# Patient Record
Sex: FEMALE | Race: WHITE | Employment: OTHER | URBAN - METROPOLITAN AREA
[De-identification: names, ages, dates, MRNs, and addresses within clinical notes are randomized per-mention and may not be internally consistent; named-entity substitution may affect disease eponyms.]

---

## 2020-03-21 ENCOUNTER — HOSPITAL ENCOUNTER (INPATIENT)
Age: 70
LOS: 5 days | Discharge: HOME HEALTH | DRG: 483 | End: 2020-03-26
Attending: EMERGENCY MEDICINE | Admitting: FAMILY MEDICINE
Payer: MEDICARE

## 2020-03-21 ENCOUNTER — APPOINTMENT (OUTPATIENT)
Dept: GENERAL RADIOLOGY | Age: 70
DRG: 483 | End: 2020-03-21
Payer: MEDICARE

## 2020-03-21 ENCOUNTER — APPOINTMENT (OUTPATIENT)
Dept: CT IMAGING | Age: 70
DRG: 483 | End: 2020-03-21
Payer: MEDICARE

## 2020-03-21 PROBLEM — I10 ESSENTIAL HYPERTENSION: Status: ACTIVE | Noted: 2020-03-21

## 2020-03-21 PROBLEM — E78.2 MIXED HYPERLIPIDEMIA: Status: ACTIVE | Noted: 2020-03-21

## 2020-03-21 PROBLEM — S42.91XA SHOULDER FRACTURE, RIGHT, CLOSED, INITIAL ENCOUNTER: Status: ACTIVE | Noted: 2020-03-21

## 2020-03-21 PROBLEM — S43.004A CLOSED DISLOCATION OF RIGHT SHOULDER: Status: ACTIVE | Noted: 2020-03-21

## 2020-03-21 LAB
-: NORMAL
ABSOLUTE EOS #: 0.5 K/UL (ref 0–0.44)
ABSOLUTE IMMATURE GRANULOCYTE: 0.16 K/UL (ref 0–0.3)
ABSOLUTE LYMPH #: 1.92 K/UL (ref 1.1–3.7)
ABSOLUTE MONO #: 0.99 K/UL (ref 0.1–1.2)
AMORPHOUS: NORMAL
ANION GAP SERPL CALCULATED.3IONS-SCNC: 16 MMOL/L (ref 9–17)
BACTERIA: NORMAL
BASOPHILS # BLD: 1 % (ref 0–2)
BASOPHILS ABSOLUTE: 0.07 K/UL (ref 0–0.2)
BILIRUBIN URINE: NEGATIVE
BUN BLDV-MCNC: 16 MG/DL (ref 8–23)
BUN/CREAT BLD: ABNORMAL (ref 9–20)
CALCIUM SERPL-MCNC: 9.4 MG/DL (ref 8.6–10.4)
CASTS UA: NORMAL /LPF (ref 0–8)
CHLORIDE BLD-SCNC: 101 MMOL/L (ref 98–107)
CHOLESTEROL/HDL RATIO: 3.4
CHOLESTEROL: 125 MG/DL
CO2: 16 MMOL/L (ref 20–31)
COLOR: YELLOW
COMMENT UA: ABNORMAL
CREAT SERPL-MCNC: 0.94 MG/DL (ref 0.5–0.9)
CRYSTALS, UA: NORMAL /HPF
DIFFERENTIAL TYPE: ABNORMAL
EOSINOPHILS RELATIVE PERCENT: 3 % (ref 1–4)
EPITHELIAL CELLS UA: NORMAL /HPF (ref 0–5)
GFR AFRICAN AMERICAN: >60 ML/MIN
GFR NON-AFRICAN AMERICAN: 59 ML/MIN
GFR SERPL CREATININE-BSD FRML MDRD: ABNORMAL ML/MIN/{1.73_M2}
GFR SERPL CREATININE-BSD FRML MDRD: ABNORMAL ML/MIN/{1.73_M2}
GLUCOSE BLD-MCNC: 238 MG/DL (ref 65–105)
GLUCOSE BLD-MCNC: 240 MG/DL (ref 70–99)
GLUCOSE URINE: ABNORMAL
HCT VFR BLD CALC: 38.3 % (ref 36.3–47.1)
HDLC SERPL-MCNC: 37 MG/DL
HEMOGLOBIN: 12.6 G/DL (ref 11.9–15.1)
IMMATURE GRANULOCYTES: 1 %
INR BLD: 1
KETONES, URINE: ABNORMAL
LDL CHOLESTEROL: 61 MG/DL (ref 0–130)
LEUKOCYTE ESTERASE, URINE: ABNORMAL
LYMPHOCYTES # BLD: 13 % (ref 24–43)
MCH RBC QN AUTO: 30.4 PG (ref 25.2–33.5)
MCHC RBC AUTO-ENTMCNC: 32.9 G/DL (ref 28.4–34.8)
MCV RBC AUTO: 92.5 FL (ref 82.6–102.9)
MONOCYTES # BLD: 7 % (ref 3–12)
MUCUS: NORMAL
NITRITE, URINE: NEGATIVE
NRBC AUTOMATED: 0 PER 100 WBC
OTHER OBSERVATIONS UA: NORMAL
PARTIAL THROMBOPLASTIN TIME: 18.6 SEC (ref 20.5–30.5)
PDW BLD-RTO: 12.8 % (ref 11.8–14.4)
PH UA: 5 (ref 5–8)
PLATELET # BLD: 366 K/UL (ref 138–453)
PLATELET ESTIMATE: ABNORMAL
PMV BLD AUTO: 10.9 FL (ref 8.1–13.5)
POTASSIUM SERPL-SCNC: 4.7 MMOL/L (ref 3.7–5.3)
PROTEIN UA: ABNORMAL
PROTHROMBIN TIME: 10.4 SEC (ref 9–12)
RBC # BLD: 4.14 M/UL (ref 3.95–5.11)
RBC # BLD: ABNORMAL 10*6/UL
RBC UA: NORMAL /HPF (ref 0–4)
RENAL EPITHELIAL, UA: NORMAL /HPF
SEG NEUTROPHILS: 75 % (ref 36–65)
SEGMENTED NEUTROPHILS ABSOLUTE COUNT: 11.13 K/UL (ref 1.5–8.1)
SODIUM BLD-SCNC: 133 MMOL/L (ref 135–144)
SPECIFIC GRAVITY UA: 1.02 (ref 1–1.03)
TRICHOMONAS: NORMAL
TRIGL SERPL-MCNC: 133 MG/DL
TURBIDITY: ABNORMAL
URINE HGB: NEGATIVE
UROBILINOGEN, URINE: NORMAL
VITAMIN D 25-HYDROXY: 8.4 NG/ML (ref 30–100)
VLDLC SERPL CALC-MCNC: ABNORMAL MG/DL (ref 1–30)
WBC # BLD: 14.8 K/UL (ref 3.5–11.3)
WBC # BLD: ABNORMAL 10*3/UL
WBC UA: NORMAL /HPF (ref 0–5)
YEAST: NORMAL

## 2020-03-21 PROCEDURE — 96374 THER/PROPH/DIAG INJ IV PUSH: CPT

## 2020-03-21 PROCEDURE — 76376 3D RENDER W/INTRP POSTPROCES: CPT

## 2020-03-21 PROCEDURE — 85730 THROMBOPLASTIN TIME PARTIAL: CPT

## 2020-03-21 PROCEDURE — 71045 X-RAY EXAM CHEST 1 VIEW: CPT

## 2020-03-21 PROCEDURE — 6370000000 HC RX 637 (ALT 250 FOR IP): Performed by: NURSE PRACTITIONER

## 2020-03-21 PROCEDURE — 80061 LIPID PANEL: CPT

## 2020-03-21 PROCEDURE — 73030 X-RAY EXAM OF SHOULDER: CPT

## 2020-03-21 PROCEDURE — 73020 X-RAY EXAM OF SHOULDER: CPT

## 2020-03-21 PROCEDURE — 73060 X-RAY EXAM OF HUMERUS: CPT

## 2020-03-21 PROCEDURE — 93005 ELECTROCARDIOGRAM TRACING: CPT | Performed by: NURSE PRACTITIONER

## 2020-03-21 PROCEDURE — 1200000000 HC SEMI PRIVATE

## 2020-03-21 PROCEDURE — 99285 EMERGENCY DEPT VISIT HI MDM: CPT

## 2020-03-21 PROCEDURE — 83036 HEMOGLOBIN GLYCOSYLATED A1C: CPT

## 2020-03-21 PROCEDURE — 85025 COMPLETE CBC W/AUTO DIFF WBC: CPT

## 2020-03-21 PROCEDURE — 85610 PROTHROMBIN TIME: CPT

## 2020-03-21 PROCEDURE — 73200 CT UPPER EXTREMITY W/O DYE: CPT

## 2020-03-21 PROCEDURE — 6360000002 HC RX W HCPCS: Performed by: EMERGENCY MEDICINE

## 2020-03-21 PROCEDURE — 0RSJXZZ REPOSITION RIGHT SHOULDER JOINT, EXTERNAL APPROACH: ICD-10-PCS | Performed by: EMERGENCY MEDICINE

## 2020-03-21 PROCEDURE — 82947 ASSAY GLUCOSE BLOOD QUANT: CPT

## 2020-03-21 PROCEDURE — 2500000003 HC RX 250 WO HCPCS: Performed by: EMERGENCY MEDICINE

## 2020-03-21 PROCEDURE — 23650 CLTX SHO DSLC W/MNPJ WO ANES: CPT

## 2020-03-21 PROCEDURE — 82306 VITAMIN D 25 HYDROXY: CPT

## 2020-03-21 PROCEDURE — 6360000002 HC RX W HCPCS: Performed by: STUDENT IN AN ORGANIZED HEALTH CARE EDUCATION/TRAINING PROGRAM

## 2020-03-21 PROCEDURE — 81001 URINALYSIS AUTO W/SCOPE: CPT

## 2020-03-21 PROCEDURE — 80048 BASIC METABOLIC PNL TOTAL CA: CPT

## 2020-03-21 RX ORDER — ETOMIDATE 2 MG/ML
30 INJECTION INTRAVENOUS ONCE
Status: DISCONTINUED | OUTPATIENT
Start: 2020-03-21 | End: 2020-03-23

## 2020-03-21 RX ORDER — ATORVASTATIN CALCIUM 20 MG/1
20 TABLET, FILM COATED ORAL DAILY
Status: DISCONTINUED | OUTPATIENT
Start: 2020-03-21 | End: 2020-03-26 | Stop reason: HOSPADM

## 2020-03-21 RX ORDER — OXYCODONE HYDROCHLORIDE AND ACETAMINOPHEN 5; 325 MG/1; MG/1
2 TABLET ORAL EVERY 4 HOURS PRN
Status: DISCONTINUED | OUTPATIENT
Start: 2020-03-21 | End: 2020-03-23

## 2020-03-21 RX ORDER — FENOFIBRATE 160 MG/1
160 TABLET ORAL DAILY
Status: DISCONTINUED | OUTPATIENT
Start: 2020-03-21 | End: 2020-03-26 | Stop reason: HOSPADM

## 2020-03-21 RX ORDER — GLUCAGON 1 MG/ML
1 KIT INJECTION PRN
Status: DISCONTINUED | OUTPATIENT
Start: 2020-03-21 | End: 2020-03-26 | Stop reason: HOSPADM

## 2020-03-21 RX ORDER — CARVEDILOL 6.25 MG/1
6.25 TABLET ORAL 2 TIMES DAILY WITH MEALS
Status: DISCONTINUED | OUTPATIENT
Start: 2020-03-21 | End: 2020-03-22

## 2020-03-21 RX ORDER — FENTANYL CITRATE 50 UG/ML
100 INJECTION, SOLUTION INTRAMUSCULAR; INTRAVENOUS ONCE
Status: DISCONTINUED | OUTPATIENT
Start: 2020-03-21 | End: 2020-03-23

## 2020-03-21 RX ORDER — ETOMIDATE 2 MG/ML
INJECTION INTRAVENOUS DAILY PRN
Status: COMPLETED | OUTPATIENT
Start: 2020-03-21 | End: 2020-03-21

## 2020-03-21 RX ORDER — INSULIN GLARGINE 100 [IU]/ML
30 INJECTION, SOLUTION SUBCUTANEOUS 2 TIMES DAILY
COMMUNITY

## 2020-03-21 RX ORDER — DEXTROSE MONOHYDRATE 50 MG/ML
100 INJECTION, SOLUTION INTRAVENOUS PRN
Status: DISCONTINUED | OUTPATIENT
Start: 2020-03-21 | End: 2020-03-26 | Stop reason: HOSPADM

## 2020-03-21 RX ORDER — ATORVASTATIN CALCIUM 40 MG/1
40 TABLET, FILM COATED ORAL DAILY
COMMUNITY

## 2020-03-21 RX ORDER — ASPIRIN 81 MG/1
81 TABLET, CHEWABLE ORAL DAILY
Status: DISCONTINUED | OUTPATIENT
Start: 2020-03-21 | End: 2020-03-26 | Stop reason: HOSPADM

## 2020-03-21 RX ORDER — NICOTINE POLACRILEX 4 MG
15 LOZENGE BUCCAL PRN
Status: DISCONTINUED | OUTPATIENT
Start: 2020-03-21 | End: 2020-03-26 | Stop reason: HOSPADM

## 2020-03-21 RX ORDER — OXYCODONE HYDROCHLORIDE AND ACETAMINOPHEN 5; 325 MG/1; MG/1
1 TABLET ORAL EVERY 4 HOURS PRN
Status: DISCONTINUED | OUTPATIENT
Start: 2020-03-21 | End: 2020-03-23

## 2020-03-21 RX ORDER — DEXTROSE MONOHYDRATE 25 G/50ML
12.5 INJECTION, SOLUTION INTRAVENOUS PRN
Status: DISCONTINUED | OUTPATIENT
Start: 2020-03-21 | End: 2020-03-26 | Stop reason: HOSPADM

## 2020-03-21 RX ORDER — FENTANYL CITRATE 50 UG/ML
50 INJECTION, SOLUTION INTRAMUSCULAR; INTRAVENOUS ONCE
Status: COMPLETED | OUTPATIENT
Start: 2020-03-21 | End: 2020-03-21

## 2020-03-21 RX ADMIN — FENTANYL CITRATE 50 MCG: 50 INJECTION, SOLUTION INTRAMUSCULAR; INTRAVENOUS at 13:20

## 2020-03-21 RX ADMIN — HYDROMORPHONE HYDROCHLORIDE 0.5 MG: 1 INJECTION, SOLUTION INTRAMUSCULAR; INTRAVENOUS; SUBCUTANEOUS at 18:32

## 2020-03-21 RX ADMIN — FENOFIBRATE 160 MG: 160 TABLET ORAL at 21:32

## 2020-03-21 RX ADMIN — INSULIN LISPRO 1 UNITS: 100 INJECTION, SOLUTION INTRAVENOUS; SUBCUTANEOUS at 21:33

## 2020-03-21 RX ADMIN — ATORVASTATIN CALCIUM 20 MG: 20 TABLET, FILM COATED ORAL at 21:32

## 2020-03-21 RX ADMIN — ASPIRIN 81 MG 81 MG: 81 TABLET ORAL at 21:32

## 2020-03-21 RX ADMIN — HYDROMORPHONE HYDROCHLORIDE 0.5 MG: 1 INJECTION, SOLUTION INTRAMUSCULAR; INTRAVENOUS; SUBCUTANEOUS at 16:28

## 2020-03-21 RX ADMIN — CARVEDILOL 6.25 MG: 6.25 TABLET, FILM COATED ORAL at 21:32

## 2020-03-21 RX ADMIN — OXYCODONE HYDROCHLORIDE AND ACETAMINOPHEN 2 TABLET: 5; 325 TABLET ORAL at 23:10

## 2020-03-21 RX ADMIN — ETOMIDATE 20 MG: 2 INJECTION, SOLUTION INTRAVENOUS at 14:42

## 2020-03-21 RX ADMIN — Medication 100 MCG: at 14:41

## 2020-03-21 RX ADMIN — METFORMIN HYDROCHLORIDE 500 MG: 500 TABLET ORAL at 22:33

## 2020-03-21 ASSESSMENT — PAIN DESCRIPTION - PROGRESSION: CLINICAL_PROGRESSION: NOT CHANGED

## 2020-03-21 ASSESSMENT — PAIN DESCRIPTION - ORIENTATION: ORIENTATION: RIGHT

## 2020-03-21 ASSESSMENT — PAIN DESCRIPTION - PAIN TYPE: TYPE: ACUTE PAIN

## 2020-03-21 ASSESSMENT — PAIN DESCRIPTION - DESCRIPTORS: DESCRIPTORS: ACHING

## 2020-03-21 ASSESSMENT — PAIN SCALES - GENERAL
PAINLEVEL_OUTOF10: 8
PAINLEVEL_OUTOF10: 6
PAINLEVEL_OUTOF10: 5
PAINLEVEL_OUTOF10: 5
PAINLEVEL_OUTOF10: 7
PAINLEVEL_OUTOF10: 4
PAINLEVEL_OUTOF10: 8

## 2020-03-21 ASSESSMENT — PAIN DESCRIPTION - LOCATION: LOCATION: SHOULDER;RIB CAGE

## 2020-03-21 ASSESSMENT — PAIN DESCRIPTION - FREQUENCY: FREQUENCY: CONTINUOUS

## 2020-03-21 ASSESSMENT — PAIN DESCRIPTION - ONSET: ONSET: SUDDEN

## 2020-03-21 NOTE — ED PROVIDER NOTES
[address]  Emergency Department Encounter  Emergency Medicine Resident     Pt Name: Eryn Ching  MRN: [id number]  [place] 1950  Date of evaluation: 3/21/20  PCP:  Liliana Hernandez MD    [address] [place] [address]       Chief Complaint   Patient presents with    Shoulder Pain       HISTORY OF PRESENT ILLNESS  (Location/Symptom, Timing/Onset, Context/Setting, Quality, Duration, Modifying Factors, Severity.)    Eryn Ching is a 81 y.o. female who presents with right shoulder pain. Patient states that she was walking in her kitchen and tripped over a event that was placed in the ground. She fell forward and hit the wall into her right side. She has significant 8 out of 10 severity shoulder pain. Is localized to the right shoulder. She is unable to move it afterwards. No numbness weakness or tingling. Denies any loss of consciousness head strike neck pain or back pain. PAST MEDICAL / SURGICAL / SOCIAL / FAMILY HISTORY    has a past medical history of Bundle branch block, right, Degenerative arthritis of lumbar spine, Diabetes mellitus ([address] Diverticulosis of intestine, Hyperlipidemia, Hypertension, Scoliosis of lumbosacral spine, and Snores. has a past surgical history that includes Hysterectomy;  section, classic; Knee Arthroplasty (Right, ); Cholecystectomy (); back surgery (13); Cardiac catheterization (); and Total shoulder arthroplasty (Right, 2020).     Social History     Socioeconomic History    Marital status:      Spouse name: Not on file    Number of children: Not on file    Years of education: Not on file    Highest education level: Not on file   Occupational History    Not on file   Social Needs    Financial resource strain: Not on file    Food insecurity     Worry: Not on file     Inability: Not on file    Transportation needs     Medical: Not on file     Non-medical: Not on file   Tobacco Use    Smoking status: Never Smoker 10/1/13  Yes Isaac Bose MD   glipiZIDE (GLUCOTROL) 10 MG tablet Take 5 mg by mouth 2 times daily (before meals)    Yes Historical Provider, MD   metFORMIN (GLUCOPHAGE) 500 MG tablet Take 500 mg by mouth 2 times daily (with meals). Yes Historical Provider, MD   simvastatin (ZOCOR) 40 MG tablet Take 40 mg by mouth nightly. Yes Historical Provider, MD   fenofibrate (TRICOR) 145 MG tablet Take 145 mg by mouth nightly. Yes Historical Provider, MD   naproxen (NAPROSYN) 500 MG tablet Take 1 tablet by mouth 2 times daily as needed. 10/1/13   Isaac Bose MD   carvedilol (COREG) 6.25 MG tablet Take 6.25 mg by mouth 2 times daily (with meals). Historical Provider, MD   cyclobenzaprine (FLEXERIL) 10 MG tablet Take 10 mg by mouth daily as needed. Historical Provider, MD       REVIEW OF SYSTEMS    (2-9 systems for level 4, 10 or more for level 5)      Constitutional : - fever , - chills, - weight change  HENT: - hearing loss , - rhinorrhea , - tinnitus , - trouble swallowing , - voice change  Eyes: - photophobia , - visual disturbance  Resp: - cough , - shortness of breath   Cardio: - chest pain , - leg swelling , - palpitations  GI: - nausea, - vomiting, - abd pain , - black/bloody BMs, - constipation , - diarrhea   Endocrine: - heat intolerance , - cold intolerance  : - dysuria, - frequency, - hematuria   MSK: - back pain , - neck pain   ALG: - food allergies  Neuro: - dizziness ,  - headache, - weakness, - syncope  Skin: - wound , - rash   Heme: - bruise easily  Psych: - agitation , - confusion      PHYSICAL EXAM   (up to 7 for level 4, 8 or more for level 5)     INITIAL VITALS:  height is 5' 2\" (1.575 m) and weight is 210 lb (95.3 kg). Her axillary temperature is 98 °F (36.7 °C). Her blood pressure is 130/68 and her pulse is 70. Her respiration is 17 and oxygen saturation is 95%.      Physical Exam   GENERAL: Not in acute distress, well developed, not diaphoretic  HENT: normocephalic, nose nml  EYES:  Notify physician if multiplier less than 0.01 results in insulin rate of 0 units/hr    Daily weights    Intake and output    Nurse to change IV Fluid when blood glucose (BG) reaches 250 mg/dL    Inpatient consult to Orthopedic Surgery    Inpatient consult to Hospitalist    OT eval and treat    PT eval and treat    Incentive spirometry    POCT Glucose    POCT Glucose    POC Glucose Fingerstick    POC Glucose Fingerstick    POC Glucose Fingerstick    POC Glucose Fingerstick    POC Glucose Fingerstick    POC Glucose Fingerstick    POC Glucose Fingerstick    POC Glucose Fingerstick    POC Glucose Fingerstick    POC Glucose Fingerstick    POCT Glucose - every hour    POC Glucose Fingerstick    POC Glucose Fingerstick    POC Glucose Fingerstick    POC Glucose Fingerstick    POC Glucose Fingerstick    POC Glucose Fingerstick    POC Glucose Fingerstick    POC Glucose Fingerstick    POC Glucose Fingerstick    POC Glucose Fingerstick    EKG 12 Lead    EKG 12 Lead    EKG REPORT    EKG REPORT    ECHO Complete 2D W Doppler W Color    PATIENT STATUS (FROM ED OR OR/PROCEDURAL) Inpatient    Transfer Patient    Transfer patient    [employer] Arm Sling, Right; M       MEDICATIONS ORDERED:  Orders Placed This Encounter   Medications    fentaNYL (SUBLIMAZE) injection 50 mcg    DISCONTD: etomidate (AMIDATE) injection 30 mg    DISCONTD: fentaNYL (SUBLIMAZE) injection 100 mcg    fentaNYL (SUBLIMAZE) IV    etomidate (AMIDATE) injection    HYDROmorphone (DILAUDID) injection 0.5 mg    aspirin chewable tablet 81 mg    DISCONTD: carvedilol (COREG) tablet 6.25 mg    fenofibrate (TRIGLIDE) tablet 160 mg    DISCONTD: metFORMIN (GLUCOPHAGE) tablet 500 mg    atorvastatin (LIPITOR) tablet 20 mg    DISCONTD: insulin lispro (HUMALOG) injection vial 0-6 Units    DISCONTD: insulin lispro (HUMALOG) injection vial 0-3 Units    glucose (GLUTOSE) 40 % oral gel 15 g    dextrose 50 % IV solution    glucagon injection 1 mg    dextrose 5 % solution    HYDROmorphone (DILAUDID) injection 0.5 mg    DISCONTD: oxyCODONE-acetaminophen (PERCOCET) 5-325 MG per tablet 1 tablet    DISCONTD: oxyCODONE-acetaminophen (PERCOCET) 5-325 MG per tablet 2 tablet    amLODIPine (NORVASC) tablet 5 mg    ceFAZolin (ANCEF) 2 g in dextrose 5 % 50 mL IVPB    sodium polystyrene (KAYEXALATE) 15 GM/60ML suspension 15 g    vitamin D (ERGOCALCIFEROL) 1.25 MG (39001 UT) CAPS capsule     Sig: Take 1 capsule by mouth once a week for 8 doses     Dispense:  8 capsule     Refill:  0    DISCONTD: lactated ringers infusion    midazolam (VERSED) 2 MG/2ML injection     DELMY GRACIA: cabinet override    DISCONTD: fentaNYL (SUBLIMAZE) injection 100 mcg    DISCONTD: midazolam (VERSED) injection 2 mg    bupivacaine (PF) (MARCAINE) 0.5 % injection 150 mg    DISCONTD: HYDROmorphone (DILAUDID) injection 0.25 mg    DISCONTD: HYDROmorphone (DILAUDID) injection 0.5 mg    DISCONTD: promethazine (PHENERGAN) injection 6.25 mg    midazolam (VERSED) injection 1 mg    fentaNYL (SUBLIMAZE) injection 50 mcg    sodium chloride 0.9 % irrigation    ceFAZolin (ANCEF) 2 g in dextrose 5 % 50 mL IVPB    acetaminophen (TYLENOL) tablet 1,000 mg     Maximum dose of acetaminophen is 4000 mg from all sources in 24 hours. Reduce dose to 650 mg in patients , 65 kg. Avoid in patient with liver disease    ketorolac (TORADOL) injection 30 mg    oxyCODONE (ROXICODONE) immediate release tablet 10 mg    oxyCODONE (ROXICODONE) immediate release tablet 5 mg    HYDROcodone-acetaminophen (NORCO) 5-325 MG per tablet     Sig: Take 1 tablet by mouth every 6 hours as needed for Pain for up to 7 days. Intended supply: 5 days.  Take lowest dose possible to manage pain     Dispense:  28 tablet     Refill:  0     Reduce doses taken as pain becomes manageable    DISCONTD: labetalol (NORMODYNE;TRANDATE) injection 10 mg    insulin regular (HUMULIN perfusion. [RB]   1187 Discussed with orthopedic surgery, has requested 1 view axillary will obtain he will evaluate patient. [RB]   [id number] Per orthopedic surgery they are reviewing imaging with attending and alignment no but patient will need from surgery.     [RB]      ED Course User Index  [RB] Smiley Curry DO         DIAGNOSTIC RESULTS / EMERGENCYDEPARTMENT COURSE   LABS:  Labs Reviewed   CBC WITH AUTO DIFFERENTIAL - Abnormal; Notable for the following components:       Result Value    WBC 14.8 (*)     Seg Neutrophils 75 (*)     Lymphocytes 13 (*)     Immature Granulocytes 1 (*)     Segs Absolute 11.13 (*)     Absolute Eos # 0.50 (*)     All other components within normal limits   BASIC METABOLIC PANEL - Abnormal; Notable for the following components:    Glucose 240 (*)     CREATININE 0.94 (*)     Sodium 133 (*)     CO2 16 (*)     GFR Non- 59 (*)     All other components within normal limits   APTT - Abnormal; Notable for the following components:    PTT 18.6 (*)     All other components within normal limits   URINALYSIS - Abnormal; Notable for the following components:    Turbidity UA CLOUDY (*)     Glucose, Ur 3+ (*)     Ketones, Urine TRACE (*)     Protein, UA 1+ (*)     Leukocyte Esterase, Urine SMALL (*)     All other components within normal limits   HEMOGLOBIN A1C - Abnormal; Notable for the following components:    Hemoglobin A1C 8.8 (*)     All other components within normal limits   LIPID PANEL - Abnormal; Notable for the following components:    HDL 37 (*)     All other components within normal limits   VITAMIN D 25 HYDROXY - Abnormal; Notable for the following components:    Vit D, 25-Hydroxy 8.4 (*)     All other components within normal limits   BRAIN NATRIURETIC PEPTIDE - Abnormal; Notable for the following components:    Pro-BNP 1,242 (*)     All other components within normal limits   TROPONIN - Abnormal; Notable for the following components:    Troponin, High Sensitivity 16 (*)     All other components within normal limits   COMP METABOLIC W/ BILI PROFILE - Abnormal; Notable for the following components:    BUN 27 (*)     CREATININE 1.29 (*)     Glucose 194 (*)     Sodium 134 (*)     Potassium 5.4 (*)     CO2 16 (*)     GFR Non- 41 (*)     GFR  50 (*)     All other components within normal limits   CBC WITH AUTO DIFFERENTIAL - Abnormal; Notable for the following components:    RBC 3.72 (*)     Hemoglobin 11.5 (*)     NRBC Automated 0.2 (*)     Seg Neutrophils 71 (*)     Lymphocytes 14 (*)     Immature Granulocytes 1 (*)     Absolute Mono # 1.27 (*)     All other components within normal limits   TROP/MYOGLOBIN - Abnormal; Notable for the following components:    Troponin, High Sensitivity 16 (*)     Myoglobin 173 (*)     All other components within normal limits   TROP/MYOGLOBIN - Abnormal; Notable for the following components:    Troponin, High Sensitivity 15 (*)     Myoglobin 143 (*)     All other components within normal limits   TROP/MYOGLOBIN - Abnormal; Notable for the following components:    Myoglobin 94 (*)     All other components within normal limits   BASIC METABOLIC PANEL - Abnormal; Notable for the following components:    Glucose 332 (*)     BUN 33 (*)     CREATININE 1.61 (*)     Calcium 8.2 (*)     Sodium 130 (*)     Potassium 5.4 (*)     Chloride 97 (*)     CO2 13 (*)     Anion Gap 20 (*)     GFR Non- 32 (*)     GFR  39 (*)     All other components within normal limits   BETA-HYDROXYBUTYRATE - Abnormal; Notable for the following components:    Beta-Hydroxybutyrate 2.65 (*)     All other components within normal limits   BASIC METABOLIC PANEL - Abnormal; Notable for the following components:    Glucose 239 (*)     BUN 35 (*)     CREATININE 1.70 (*)     Calcium 8.4 (*)     Sodium 132 (*)     CO2 19 (*)     GFR Non- 30 (*)     GFR  36 (*)     All other components within normal limits   BASIC METABOLIC PANEL - Abnormal; Notable for the following components:    Glucose 157 (*)     BUN 34 (*)     CREATININE 1.47 (*)     Calcium 8.0 (*)     Sodium 131 (*)     Potassium 5.4 (*)     CO2 16 (*)     GFR Non- 35 (*)     GFR  43 (*)     All other components within normal limits   BASIC METABOLIC PANEL - Abnormal; Notable for the following components:    Glucose 131 (*)     BUN 35 (*)     CREATININE 1.52 (*)     Calcium 8.0 (*)     Sodium 134 (*)     CO2 18 (*)     GFR Non- 34 (*)     GFR  41 (*)     All other components within normal limits   POC GLUCOSE FINGERSTICK - Abnormal; Notable for the following components:    POC Glucose 238 (*)     All other components within normal limits   POC GLUCOSE FINGERSTICK - Abnormal; Notable for the following components:    POC Glucose 174 (*)     All other components within normal limits   POC GLUCOSE FINGERSTICK - Abnormal; Notable for the following components:    POC Glucose 178 (*)     All other components within normal limits   POC GLUCOSE FINGERSTICK - Abnormal; Notable for the following components:    POC Glucose 194 (*)     All other components within normal limits   POC GLUCOSE FINGERSTICK - Abnormal; Notable for the following components:    POC Glucose 184 (*)     All other components within normal limits   POC GLUCOSE FINGERSTICK - Abnormal; Notable for the following components:    POC Glucose 231 (*)     All other components within normal limits   POC GLUCOSE FINGERSTICK - Abnormal; Notable for the following components:    POC Glucose 141 (*)     All other components within normal limits   POC GLUCOSE FINGERSTICK - Abnormal; Notable for the following components:    POC Glucose 268 (*)     All other components within normal limits   POC GLUCOSE FINGERSTICK - Abnormal; Notable for the following components:    POC Glucose 293 (*)     All other components within normal limits   POC GLUCOSE FINGERSTICK - Abnormal; Notable for the following components:    POC Glucose 297 (*)     All other components within normal limits   POC GLUCOSE FINGERSTICK - Abnormal; Notable for the following components:    POC Glucose 290 (*)     All other components within normal limits   POC GLUCOSE FINGERSTICK - Abnormal; Notable for the following components:    POC Glucose 228 (*)     All other components within normal limits   POC GLUCOSE FINGERSTICK - Abnormal; Notable for the following components:    POC Glucose 175 (*)     All other components within normal limits   POC GLUCOSE FINGERSTICK - Abnormal; Notable for the following components:    POC Glucose 179 (*)     All other components within normal limits   POC GLUCOSE FINGERSTICK - Abnormal; Notable for the following components:    POC Glucose 176 (*)     All other components within normal limits   POC GLUCOSE FINGERSTICK - Abnormal; Notable for the following components:    POC Glucose 157 (*)     All other components within normal limits   POC GLUCOSE FINGERSTICK - Abnormal; Notable for the following components:    POC Glucose 158 (*)     All other components within normal limits   POC GLUCOSE FINGERSTICK - Abnormal; Notable for the following components:    POC Glucose 146 (*)     All other components within normal limits   POC GLUCOSE FINGERSTICK - Abnormal; Notable for the following components:    POC Glucose 130 (*)     All other components within normal limits   POC GLUCOSE FINGERSTICK - Abnormal; Notable for the following components:    POC Glucose 136 (*)     All other components within normal limits   PROTIME-INR   MICROSCOPIC URINALYSIS   IMMATURE PLATELET FRACTION   POTASSIUM   LACTIC ACID, WHOLE BLOOD   MAGNESIUM   MAGNESIUM   MAGNESIUM   PHOSPHORUS   PHOSPHORUS   PHOSPHORUS   BASIC METABOLIC PANEL   MAGNESIUM   PHOSPHORUS   BASIC METABOLIC PANEL   MAGNESIUM   PHOSPHORUS   BASIC METABOLIC PANEL   MAGNESIUM   PHOSPHORUS   POCT GLUCOSE   POCT GLUCOSE DISPOSITION            PATIENT REFERRED TO:  Jay Jay Marie MD  [address] [address]  [address], [place] [phone]    Schedule an appointment as soon as possible for a visit in 2 weeks  For suture removal, For wound re-check      DISCHARGE MEDICATIONS:  Current Discharge Medication List      START taking these medications    Details   vitamin D (ERGOCALCIFEROL) 1.25 MG (99729 UT) CAPS capsule Take 1 capsule by mouth once a week for 8 doses  Qty: 8 capsule, Refills: 0      HYDROcodone-acetaminophen (NORCO) 5-325 MG per tablet Take 1 tablet by mouth every 6 hours as needed for Pain for up to 7 days. Intended supply: 5 days. Take lowest dose possible to manage pain  Qty: 28 tablet, Refills: 0    Comments: Reduce doses taken as pain becomes manageable  Associated Diagnoses: Closed fracture dislocation of right shoulder, initial encounter             Dr. Maria Fernanda Chan.  [address] [place] [address]  Emergency Medicine Resident Physician, PGY-2    (Please note that portions of this note were completed with a voice recognition program.  Efforts were made to edit the dictations but occasionally words are mis-transcribed.)         Mendy Vaca DO  Resident  03/24/20 9752

## 2020-03-21 NOTE — ED NOTES
Patient presents to ED via [facility] for evaluation of right shoulder pain. Patient reports she was walking in her kitchen PTA when she tripped on a heating vent on the floor and fell forward. Patient attempted to catch herself on the wall using her right arm, resulting in extreme right shoulder pain. Patient reports right sided rib pain as well. Patient denies hitting her head. No LOC. Patient was given 50 mcg fentanyl en route per EMS. Patient reports some nausea, but denying need for antiemetic at this time. Patient right arm placed in sling PTA by EMS.      Jhonny Pearson RN  03/21/20 4205

## 2020-03-21 NOTE — CONSULTS
Orthopedic Surgery Consult  (Dr. Talia Dahl)      CC/Reason for consult:  Right proximal humerus fracture-dislocation    HPI:      The patient is a 81 y.o. female with presents to [facility] after mechanical fall earlier today. Pt reports she landed onto her right shoulder. Pt felt immediate pain and pain with ROM. Pt is right handed. Pt denies hitting head, LOC. Pt denies numbness, tingling. Pt was closed reduced by ED team under conscious sedation. Pt reports pain improved after reduction. Pt denies prior history of right shoulder pain. Pt denies prior dislocations to right shoulder. Pt denies pain elsewhere. Pt with medical hx of DM, HTN. Past Medical History:    Past Medical History:   Diagnosis Date    Bundle branch block, right     Degenerative arthritis of lumbar spine     Diabetes mellitus [address] [address]     Diverticulosis of intestine     Scoliosis of lumbosacral spine        Past Surgical History:    Past Surgical History:   Procedure Laterality Date    BACK SURGERY  13    l4-5/l5-s1 laminotomies and formanotomies    CARDIAC CATHETERIZATION      coronary artery block-- minimal     SECTION, CLASSIC      2 times    CHOLECYSTECTOMY  1984    HYSTERECTOMY      KNEE ARTHROPLASTY Right        Medications Prior to Admission:   Prior to Admission medications    Medication Sig Start Date End Date Taking? Authorizing Provider   insulin glargine (LANTUS) 100 UNIT/ML injection vial Inject 30 Units into the skin 2 times daily   Yes Historical Provider, MD   aspirin 81 MG tablet Take 1 tablet by mouth daily. 10/1/13  Yes Christie Corbin MD   glipiZIDE (GLUCOTROL) 10 MG tablet Take 5 mg by mouth 2 times daily (before meals)    Yes Historical Provider, MD   metFORMIN (GLUCOPHAGE) 500 MG tablet Take 500 mg by mouth 2 times daily (with meals). Yes Historical Provider, MD   simvastatin (ZOCOR) 40 MG tablet Take 40 mg by mouth nightly.    Yes Historical Provider, MD   fenofibrate (TRICOR) 145 MG tablet Take 145 mg by mouth nightly. Yes Historical Provider, MD   oxyCODONE-acetaminophen (PERCOCET) 5-325 MG per tablet 1-2 po q 4-6 hrs prn pain 9/24/13   Scotty Nick MD   naproxen (NAPROSYN) 500 MG tablet Take 1 tablet by mouth 2 times daily as needed. 10/1/13   Johnny Miner MD   carvedilol (COREG) 6.25 MG tablet Take 6.25 mg by mouth 2 times daily (with meals). Historical Provider, MD   cyclobenzaprine (FLEXERIL) 10 MG tablet Take 10 mg by mouth daily as needed. Historical Provider, MD       Allergies:    Patient has no known allergies.     Social History:   Social History     Socioeconomic History    Marital status:      Spouse name: None    Number of children: None    Years of education: None    Highest education level: None   Occupational History    None   Social Needs    Financial resource strain: None    Food insecurity     Worry: None     Inability: None    Transportation needs     Medical: None     Non-medical: None   Tobacco Use    Smoking status: Never Smoker    Smokeless tobacco: Never Used   Substance and Sexual Activity    Alcohol use: Not Currently    Drug use: No    Sexual activity: None   Lifestyle    Physical activity     Days per week: None     Minutes per session: None    Stress: None   Relationships    Social connections     Talks on phone: None     Gets together: None     Attends Yarsani service: None     Active member of club or organization: None     Attends meetings of clubs or organizations: None     Relationship status: None    Intimate partner violence     Fear of current or ex partner: None     Emotionally abused: None     Physically abused: None     Forced sexual activity: None   Other Topics Concern    None   Social History Narrative    None       Family History:  Family History   Problem Relation Age of Onset    Heart Failure Mother     Diabetes Mother     Heart Failure Father     Diabetes Father     Heart Failure Brother        REVIEW OF SYSTEMS:   General: No LOC  CV: No chest pain  Resp: no SOB  MSK: Positive for myalgias and right shoulder pain. 10 point ROS negative other than stated above      PHYSICAL EXAM:  Blood pressure (!) 172/68, pulse 68, temperature 97.8 °F (36.6 °C), temperature source Oral, resp. rate 15, height 5' 2\" (1.575 m), weight 210 lb (95.3 kg), SpO2 97 %. Gen: alert and oriented, NAD, cooperative  Head: normocephalic atraumatic   Neck: supple  Chest: Non labored breathing. Cardiovascular: Regular rate, no dependent edema, distal pulses 2+  Respiratory: Chest symmetric, no accessory muscle use, normal respirations  Abdomen: non distended    R shoulder: Sling in place. TTP globally about shoulder. No ecchymoses, abrasion, deformity, erythema or lacerations. Skin intact. No TTP or crepitus to elbow, forearm, wrist, or hand. Compartments soft and compressible. Hand is warm and perfused. Wrist extension 2/5, finger extension 2/5. Ulnar/Median/AIN motor intact 4/5.  strength 4/5. C4-T1 SILT, no dysesthesias to axillary distribution. Radial pulse 2+ with BCR    LABS:  Recent Labs     03/21/20  1733   WBC 14.8*   HGB 12.6   HCT 38.3      INR 1.0   *   K 4.7   BUN 16   CREATININE 0.94*   GLUCOSE 240*        Radiology:   -Multiple XRs/CT right shoulder demonstrates displaced 4-part proximal humerus fracture    A/P: 81 y.o. female being seen for right proximal humerus fracture-dislocation    -No acute surgical intervention  -Right shoulder closed reduced by ED team  -Weight bearing: NWB RUE  -Maintain sling, ok to work on right elbow, wrist, finger ROM  -Plan for OR Monday 3/23 for reverse TSA  -Will require medical optimization and surgical risk stratification from primary documented  -Consented and operative extremity marked  -Of note, right wrist extension, finger weakness. Improved during evaluation. Will continue to monitor.   -IM to admit  -Pain control per primary  -Strict ice and elevation for pain/swelling  -F/u pre-op labs, 3D recons  -DVT ppx: EPC, ok to resume.  Hold morning of surgery  -Please page Ortho with any questions or concerns      Ann Bardales DO   Orthopedic Surgery Resident PGY-2  [facility]

## 2020-03-21 NOTE — ED PROVIDER NOTES
[facility]  Emergency Department  Emergency Medicine Resident Sign-out     Care of Leila Russ was assumed from Dr. Bibi Asif and is being seen for Shoulder Pain  . The patient's initial evaluation and plan have been discussed with the prior provider who initially evaluated the patient. EMERGENCY DEPARTMENT COURSE / MEDICAL DECISION MAKING:       MEDICATIONS GIVEN:  Orders Placed This Encounter   Medications    fentaNYL (SUBLIMAZE) injection 50 mcg    etomidate (AMIDATE) injection 30 mg    fentaNYL (SUBLIMAZE) injection 100 mcg    fentaNYL (SUBLIMAZE) IV    etomidate (AMIDATE) injection    HYDROmorphone (DILAUDID) injection 0.5 mg       LABS / RADIOLOGY:     No results found for this visit on 03/21/20. Xr Shoulder Right 1 Vw    Result Date: 3/21/2020  EXAMINATION: ONE XRAY VIEW OF THE RIGHT SHOULDER; THREE XRAY VIEWS OF THE RIGHT SHOULDER 3/21/2020 3:09 pm COMPARISON: None. HISTORY: ORDERING SYSTEM PROVIDED HISTORY: post reduction TECHNOLOGIST PROVIDED HISTORY: post reduction Reason for Exam: attempted reduction rt shoulder; ORDERING SYSTEM PROVIDED HISTORY: post reduction TECHNOLOGIST PROVIDED HISTORY: post reduction Reason for Exam: post reduction rt shoulder FINDINGS: 3 sequential images of the right shoulder have been submitted for review. The first 2 images demonstrate persistent fracture-dislocation of the right shoulder. The 3rd exam demonstrates successful reduction of the right shoulder with a displaced fracture seen along the superior margin of the humeral head. Sequential images of the right shoulder demonstrating eventual reduction of the right shoulder. Large displaced fracture along the superior margin of the humeral head. Xr Shoulder Right 1 Vw    Result Date: 3/21/2020  EXAMINATION: ONE XRAY VIEW OF THE RIGHT SHOULDER; THREE XRAY VIEWS OF THE RIGHT SHOULDER 3/21/2020 3:09 pm COMPARISON: None.  HISTORY: ORDERING SYSTEM PROVIDED HISTORY: post reduction TECHNOLOGIST PROVIDED HISTORY: post reduction Reason for Exam: attempted reduction rt shoulder; ORDERING SYSTEM PROVIDED HISTORY: post reduction TECHNOLOGIST PROVIDED HISTORY: post reduction Reason for Exam: post reduction rt shoulder FINDINGS: 3 sequential images of the right shoulder have been submitted for review. The first 2 images demonstrate persistent fracture-dislocation of the right shoulder. The 3rd exam demonstrates successful reduction of the right shoulder with a displaced fracture seen along the superior margin of the humeral head. Sequential images of the right shoulder demonstrating eventual reduction of the right shoulder. Large displaced fracture along the superior margin of the humeral head. Xr Shoulder Right (min 2 Views)    Result Date: 3/21/2020  EXAMINATION: ONE XRAY VIEW OF THE RIGHT SHOULDER; THREE XRAY VIEWS OF THE RIGHT SHOULDER 3/21/2020 3:09 pm COMPARISON: None. HISTORY: ORDERING SYSTEM PROVIDED HISTORY: post reduction TECHNOLOGIST PROVIDED HISTORY: post reduction Reason for Exam: attempted reduction rt shoulder; ORDERING SYSTEM PROVIDED HISTORY: post reduction TECHNOLOGIST PROVIDED HISTORY: post reduction Reason for Exam: post reduction rt shoulder FINDINGS: 3 sequential images of the right shoulder have been submitted for review. The first 2 images demonstrate persistent fracture-dislocation of the right shoulder. The 3rd exam demonstrates successful reduction of the right shoulder with a displaced fracture seen along the superior margin of the humeral head. Sequential images of the right shoulder demonstrating eventual reduction of the right shoulder. Large displaced fracture along the superior margin of the humeral head.      Xr Shoulder Right (min 2 Views)    Result Date: 3/21/2020  EXAMINATION: TWO XRAY VIEWS OF THE RIGHT HUMERUS; 2 XRAY VIEWS OF THE RIGHT SHOULDER 3/21/2020 1:28 pm COMPARISON: None HISTORY: ORDERING SYSTEM PROVIDED HISTORY: fall TECHNOLOGIST PROVIDED head articular surface is rotated approximately 90 degrees anteriorly and superiorly with the majority of the articular surface not articulating with the glenoid. There is subtle concavity of the inferior glenoid which could represent an impaction fracture. Mild AC joint osteoarthrosis. There is fat stranding in the right axilla compatible with a hematoma. Subsegmental atelectasis is seen in the right lung base. Comminuted impacted proximal humerus fracture centered at the humeral head neck junction. There is a dominant displaced fracture fragment superiorly likely representing the greater tuberosity. The humeral head articular surface is rotated approximately 90 degrees anteriorly and superiorly with the majority of articular surface not articulating with the glenoid. Subtle concavity of the inferior glenoid could represent an impaction fracture. No displaced glenoid fracture is seen. Fat stranding in the right axilla compatible with a hematoma. RECENT VITALS:     Temp: 97.8 °F (36.6 °C),  Pulse: 63, Resp: 11, BP: (!) 175/87, SpO2: 100 %    This patient is a 81 y.o. Female with Fall from standing height, comminuted fracture/dislocation right shoulder. CT done, ortho deciding discharge vs. Admission. Arm neurovasc intact. Pain controlled. Orthopedics decided to admit the patient for surgery on Monday. Patient's only issue is an isolated fracture/dislocation, there is NO CONCERN for upper respiratory infection, cough, fever, or novel coronavirus infection. OUTSTANDING TASKS / RECOMMENDATIONS:    1. Disposition     FINAL IMPRESSION:     1. Dislocation of right shoulder joint, initial encounter        DISPOSITION:         DISPOSITION:  []  Home   []  Nursing Facility   []  Transfer -    [x]  Admission -  [facility]   FOLLOW-UP: No follow-up provider specified.    DISCHARGE MEDICATIONS: New Prescriptions    No medications on file          Meena Stevens DO  Emergency Medicine Resident  [facility]

## 2020-03-21 NOTE — ED NOTES
Writer attempted to virgen report again and nurse was unavailable after being on hold for 6 minutes.       Yasmin Wallis RN  03/21/20 0057

## 2020-03-21 NOTE — ED NOTES
Writer assumed care of pt at this time. Pt in bed, eating meal tray. Writer updated pt on room assignment and POC. No further needs at this time.       Michael Delgado RN  03/21/20 1921

## 2020-03-22 LAB
ABSOLUTE EOS #: 0.2 K/UL (ref 0–0.44)
ABSOLUTE IMMATURE GRANULOCYTE: 0.1 K/UL (ref 0–0.3)
ABSOLUTE LYMPH #: 1.61 K/UL (ref 1.1–3.7)
ABSOLUTE MONO #: 1.27 K/UL (ref 0.1–1.2)
ALBUMIN SERPL-MCNC: 3.6 G/DL (ref 3.5–5.2)
ALBUMIN/GLOBULIN RATIO: 1 (ref 1–2.5)
ALP BLD-CCNC: 52 U/L (ref 35–104)
ALT SERPL-CCNC: 28 U/L (ref 5–33)
ANION GAP SERPL CALCULATED.3IONS-SCNC: 16 MMOL/L (ref 9–17)
AST SERPL-CCNC: 27 U/L
BASOPHILS # BLD: 0 % (ref 0–2)
BASOPHILS ABSOLUTE: 0.05 K/UL (ref 0–0.2)
BILIRUB SERPL-MCNC: 0.34 MG/DL (ref 0.3–1.2)
BILIRUBIN DIRECT: <0.08 MG/DL
BILIRUBIN, INDIRECT: ABNORMAL MG/DL (ref 0–1)
BNP INTERPRETATION: ABNORMAL
BUN BLDV-MCNC: 27 MG/DL (ref 8–23)
CALCIUM SERPL-MCNC: 8.9 MG/DL (ref 8.6–10.4)
CHLORIDE BLD-SCNC: 102 MMOL/L (ref 98–107)
CO2: 16 MMOL/L (ref 20–31)
CREAT SERPL-MCNC: 1.29 MG/DL (ref 0.5–0.9)
DIFFERENTIAL TYPE: ABNORMAL
EOSINOPHILS RELATIVE PERCENT: 2 % (ref 1–4)
ESTIMATED AVERAGE GLUCOSE: 206 MG/DL
GFR AFRICAN AMERICAN: 50 ML/MIN
GFR NON-AFRICAN AMERICAN: 41 ML/MIN
GFR SERPL CREATININE-BSD FRML MDRD: ABNORMAL ML/MIN/{1.73_M2}
GFR SERPL CREATININE-BSD FRML MDRD: ABNORMAL ML/MIN/{1.73_M2}
GLUCOSE BLD-MCNC: 174 MG/DL (ref 65–105)
GLUCOSE BLD-MCNC: 178 MG/DL (ref 65–105)
GLUCOSE BLD-MCNC: 184 MG/DL (ref 65–105)
GLUCOSE BLD-MCNC: 194 MG/DL (ref 65–105)
GLUCOSE BLD-MCNC: 194 MG/DL (ref 70–99)
GLUCOSE BLD-MCNC: 231 MG/DL (ref 65–105)
HBA1C MFR BLD: 8.8 % (ref 4–6)
HCT VFR BLD CALC: 36.4 % (ref 36.3–47.1)
HEMOGLOBIN: 11.5 G/DL (ref 11.9–15.1)
IMMATURE GRANULOCYTES: 1 %
LYMPHOCYTES # BLD: 14 % (ref 24–43)
MCH RBC QN AUTO: 30.9 PG (ref 25.2–33.5)
MCHC RBC AUTO-ENTMCNC: 31.6 G/DL (ref 28.4–34.8)
MCV RBC AUTO: 97.8 FL (ref 82.6–102.9)
MONOCYTES # BLD: 11 % (ref 3–12)
MYOGLOBIN: 143 NG/ML (ref 25–58)
MYOGLOBIN: 173 NG/ML (ref 25–58)
NRBC AUTOMATED: 0.2 PER 100 WBC
PDW BLD-RTO: 12.9 % (ref 11.8–14.4)
PLATELET # BLD: ABNORMAL K/UL (ref 138–453)
PLATELET ESTIMATE: ABNORMAL
PLATELET, FLUORESCENCE: 288 K/UL (ref 138–453)
PLATELET, IMMATURE FRACTION: 2.5 % (ref 1.1–10.3)
PMV BLD AUTO: ABNORMAL FL (ref 8.1–13.5)
POTASSIUM SERPL-SCNC: 4.8 MMOL/L (ref 3.7–5.3)
POTASSIUM SERPL-SCNC: 5.4 MMOL/L (ref 3.7–5.3)
PRO-BNP: 1242 PG/ML
RBC # BLD: 3.72 M/UL (ref 3.95–5.11)
RBC # BLD: ABNORMAL 10*6/UL
SEG NEUTROPHILS: 71 % (ref 36–65)
SEGMENTED NEUTROPHILS ABSOLUTE COUNT: 7.93 K/UL (ref 1.5–8.1)
SODIUM BLD-SCNC: 134 MMOL/L (ref 135–144)
TOTAL PROTEIN: 7.2 G/DL (ref 6.4–8.3)
TROPONIN INTERP: ABNORMAL
TROPONIN T: ABNORMAL NG/ML
TROPONIN, HIGH SENSITIVITY: 15 NG/L (ref 0–14)
TROPONIN, HIGH SENSITIVITY: 16 NG/L (ref 0–14)
TROPONIN, HIGH SENSITIVITY: 16 NG/L (ref 0–14)
WBC # BLD: 11.2 K/UL (ref 3.5–11.3)
WBC # BLD: ABNORMAL 10*3/UL

## 2020-03-22 PROCEDURE — 82947 ASSAY GLUCOSE BLOOD QUANT: CPT

## 2020-03-22 PROCEDURE — 85025 COMPLETE CBC W/AUTO DIFF WBC: CPT

## 2020-03-22 PROCEDURE — 80053 COMPREHEN METABOLIC PANEL: CPT

## 2020-03-22 PROCEDURE — 93005 ELECTROCARDIOGRAM TRACING: CPT | Performed by: FAMILY MEDICINE

## 2020-03-22 PROCEDURE — 99223 1ST HOSP IP/OBS HIGH 75: CPT | Performed by: FAMILY MEDICINE

## 2020-03-22 PROCEDURE — 85055 RETICULATED PLATELET ASSAY: CPT

## 2020-03-22 PROCEDURE — 83880 ASSAY OF NATRIURETIC PEPTIDE: CPT

## 2020-03-22 PROCEDURE — 6370000000 HC RX 637 (ALT 250 FOR IP): Performed by: FAMILY MEDICINE

## 2020-03-22 PROCEDURE — 1200000000 HC SEMI PRIVATE

## 2020-03-22 PROCEDURE — 6370000000 HC RX 637 (ALT 250 FOR IP): Performed by: NURSE PRACTITIONER

## 2020-03-22 PROCEDURE — 36415 COLL VENOUS BLD VENIPUNCTURE: CPT

## 2020-03-22 PROCEDURE — 84484 ASSAY OF TROPONIN QUANT: CPT

## 2020-03-22 PROCEDURE — 83874 ASSAY OF MYOGLOBIN: CPT

## 2020-03-22 PROCEDURE — 82248 BILIRUBIN DIRECT: CPT

## 2020-03-22 PROCEDURE — 84132 ASSAY OF SERUM POTASSIUM: CPT

## 2020-03-22 PROCEDURE — APPSS45 APP SPLIT SHARED TIME 31-45 MINUTES: Performed by: NURSE PRACTITIONER

## 2020-03-22 RX ORDER — AMLODIPINE BESYLATE 5 MG/1
5 TABLET ORAL DAILY
Status: DISCONTINUED | OUTPATIENT
Start: 2020-03-22 | End: 2020-03-25

## 2020-03-22 RX ORDER — SODIUM POLYSTYRENE SULFONATE 15 G/60ML
15 SUSPENSION ORAL; RECTAL ONCE
Status: COMPLETED | OUTPATIENT
Start: 2020-03-22 | End: 2020-03-22

## 2020-03-22 RX ADMIN — OXYCODONE HYDROCHLORIDE AND ACETAMINOPHEN 2 TABLET: 5; 325 TABLET ORAL at 14:24

## 2020-03-22 RX ADMIN — FENOFIBRATE 160 MG: 160 TABLET ORAL at 07:59

## 2020-03-22 RX ADMIN — INSULIN LISPRO 1 UNITS: 100 INJECTION, SOLUTION INTRAVENOUS; SUBCUTANEOUS at 16:03

## 2020-03-22 RX ADMIN — OXYCODONE HYDROCHLORIDE AND ACETAMINOPHEN 2 TABLET: 5; 325 TABLET ORAL at 04:46

## 2020-03-22 RX ADMIN — INSULIN LISPRO 1 UNITS: 100 INJECTION, SOLUTION INTRAVENOUS; SUBCUTANEOUS at 13:28

## 2020-03-22 RX ADMIN — METFORMIN HYDROCHLORIDE 500 MG: 500 TABLET ORAL at 07:59

## 2020-03-22 RX ADMIN — INSULIN LISPRO 2 UNITS: 100 INJECTION, SOLUTION INTRAVENOUS; SUBCUTANEOUS at 20:36

## 2020-03-22 RX ADMIN — ATORVASTATIN CALCIUM 20 MG: 20 TABLET, FILM COATED ORAL at 07:59

## 2020-03-22 RX ADMIN — OXYCODONE HYDROCHLORIDE AND ACETAMINOPHEN 2 TABLET: 5; 325 TABLET ORAL at 22:54

## 2020-03-22 RX ADMIN — SODIUM POLYSTYRENE SULFONATE 15 G: 15 SUSPENSION ORAL; RECTAL at 13:28

## 2020-03-22 RX ADMIN — OXYCODONE HYDROCHLORIDE AND ACETAMINOPHEN 2 TABLET: 5; 325 TABLET ORAL at 09:43

## 2020-03-22 RX ADMIN — ASPIRIN 81 MG 81 MG: 81 TABLET ORAL at 07:59

## 2020-03-22 RX ADMIN — INSULIN LISPRO 1 UNITS: 100 INJECTION, SOLUTION INTRAVENOUS; SUBCUTANEOUS at 07:59

## 2020-03-22 RX ADMIN — METFORMIN HYDROCHLORIDE 500 MG: 500 TABLET ORAL at 16:05

## 2020-03-22 ASSESSMENT — PAIN SCALES - GENERAL
PAINLEVEL_OUTOF10: 7
PAINLEVEL_OUTOF10: 9
PAINLEVEL_OUTOF10: 7
PAINLEVEL_OUTOF10: 3
PAINLEVEL_OUTOF10: 7

## 2020-03-22 NOTE — H&P
secondary to fall. Of note; patient did get carvedilol dose tonight prior to my arrival-- she is noted to have HR:50 and SBP:104 from 140s. Past Medical History:     Past Medical History:   Diagnosis Date    Bundle branch block, right 2009    Degenerative arthritis of lumbar spine     Diabetes mellitus ([address]     Diverticulosis of intestine     Scoliosis of lumbosacral spine         Past Surgical History:     Past Surgical History:   Procedure Laterality Date    BACK SURGERY  13    l4-5/l5-s1 laminotomies and formanotomies    CARDIAC CATHETERIZATION      coronary artery block-- minimal     SECTION, CLASSIC      2 times    CHOLECYSTECTOMY  1984    HYSTERECTOMY      KNEE ARTHROPLASTY Right         Medications Prior to Admission:     Prior to Admission medications    Medication Sig Start Date End Date Taking? Authorizing Provider   insulin glargine (LANTUS) 100 UNIT/ML injection vial Inject 30 Units into the skin 2 times daily   Yes Historical Provider, MD   SITagliptin (JANUVIA) 25 MG tablet Take 100 mg by mouth daily   Yes Historical Provider, MD   atorvastatin (LIPITOR) 40 MG tablet Take 40 mg by mouth daily   Yes Historical Provider, MD   aspirin 81 MG tablet Take 1 tablet by mouth daily. 10/1/13  Yes Sophia Motley MD   glipiZIDE (GLUCOTROL) 10 MG tablet Take 5 mg by mouth 2 times daily (before meals)    Yes Historical Provider, MD   metFORMIN (GLUCOPHAGE) 500 MG tablet Take 500 mg by mouth 2 times daily (with meals). Yes Historical Provider, MD   simvastatin (ZOCOR) 40 MG tablet Take 40 mg by mouth nightly. Yes Historical Provider, MD   fenofibrate (TRICOR) 145 MG tablet Take 145 mg by mouth nightly. Yes Historical Provider, MD   oxyCODONE-acetaminophen (PERCOCET) 5-325 MG per tablet 1-2 po q 4-6 hrs prn pain 13   Wilder Orozco MD   naproxen (NAPROSYN) 500 MG tablet Take 1 tablet by mouth 2 times daily as needed.  10/1/13   Sophia Motley MD   carvedilol (COREG) 6.25 MG tablet Take 6.25 mg by mouth 2 times daily (with meals). Historical Provider, MD   cyclobenzaprine (FLEXERIL) 10 MG tablet Take 10 mg by mouth daily as needed. Historical Provider, MD        Allergies:     Patient has no known allergies. Social History:     Tobacco:    reports that she has never smoked. She has never used smokeless tobacco.  Alcohol:      reports previous alcohol use. Drug Use:  reports no history of drug use. Family History:     Family History   Problem Relation Age of Onset    Heart Failure Mother     Diabetes Mother     Heart Failure Father     Diabetes Father     Heart Failure Brother        Review of Systems:     Positive and Negative as described in HPI.     CONSTITUTIONAL:  negative for fevers, chills, sweats, fatigue, weight loss  HEENT:  negative for vision, hearing changes, runny nose, throat pain  RESPIRATORY:  negative for shortness of breath, cough, congestion, wheezing  CARDIOVASCULAR:  negative for chest pain, palpitations  GASTROINTESTINAL:  negative for nausea, vomiting, diarrhea, constipation, change in bowel habits, abdominal pain   GENITOURINARY:  negative for difficulty of urination, burning with urination, frequency   INTEGUMENT:  negative for rash, skin lesions, easy bruising   HEMATOLOGIC/LYMPHATIC:  negative for swelling/edema   ALLERGIC/IMMUNOLOGIC:  negative for urticaria , itching  ENDOCRINE:  negative increase in drinking, increase in urination, hot or cold intolerance  MUSCULOSKELETAL:  +right shoulder pain, denies muscle aches, swelling of joints  NEUROLOGICAL:  negative for headaches, dizziness, lightheadedness, numbness, pain, tingling extremities  BEHAVIOR/PSYCH:  negative for depression, anxiety    Physical Exam:   BP (!) 111/49   Pulse 66   Temp 99.1 °F (37.3 °C) (Oral)   Resp 16   Ht 5' 2\" (1.575 m)   Wt 210 lb (95.3 kg)   SpO2 96%   BMI 38.41 kg/m²   Temp (24hrs), Av.1 °F (36.7 °C), Min:97.6 °F (36.4 °C), Max:99.1 °F (37.3 Eosinophils % 3 1 - 4 %    Basophils 1 0 - 2 %    Immature Granulocytes 1 (H) 0 %    Segs Absolute 11.13 (H) 1.50 - 8.10 k/uL    Absolute Lymph # 1.92 1.10 - 3.70 k/uL    Absolute Mono # 0.99 0.10 - 1.20 k/uL    Absolute Eos # 0.50 (H) 0.00 - 0.44 k/uL    Basophils Absolute 0.07 0.00 - 0.20 k/uL    Absolute Immature Granulocyte 0.16 0.00 - 0.30 k/uL    WBC Morphology NOT REPORTED     RBC Morphology NOT REPORTED     Platelet Estimate NOT REPORTED    BASIC METABOLIC PANEL    Collection Time: 03/21/20  5:33 PM   Result Value Ref Range    Glucose 240 (H) 70 - 99 mg/dL    BUN 16 8 - 23 mg/dL    CREATININE 0.94 (H) 0.50 - 0.90 mg/dL    Bun/Cre Ratio NOT REPORTED 9 - 20    Calcium 9.4 8.6 - 10.4 mg/dL    Sodium 133 (L) 135 - 144 mmol/L    Potassium 4.7 3.7 - 5.3 mmol/L    Chloride 101 98 - 107 mmol/L    CO2 16 (L) 20 - 31 mmol/L    Anion Gap 16 9 - 17 mmol/L    GFR Non-African American 59 (L) >60 mL/min    GFR African American >60 >60 mL/min    GFR Comment          GFR Staging NOT REPORTED    Protime-INR    Collection Time: 03/21/20  5:33 PM   Result Value Ref Range    Protime 10.4 9.0 - 12.0 sec    INR 1.0    APTT    Collection Time: 03/21/20  5:33 PM   Result Value Ref Range    PTT 18.6 (L) 20.5 - 30.5 sec   Lipid Panel    Collection Time: 03/21/20  5:33 PM   Result Value Ref Range    Cholesterol 125 <200 mg/dL    HDL 37 (L) >40 mg/dL    LDL Cholesterol 61 0 - 130 mg/dL    Chol/HDL Ratio 3.4 <5    Triglycerides 133 <150 mg/dL    VLDL NOT REPORTED 1 - 30 mg/dL   Vitamin D 25 Hydroxy    Collection Time: 03/21/20  5:33 PM   Result Value Ref Range    Vit D, 25-Hydroxy 8.4 (L) 30.0 - 100.0 ng/mL   URINALYSIS    Collection Time: 03/21/20  8:31 PM   Result Value Ref Range    Color, UA YELLOW YELLOW    Turbidity UA CLOUDY (A) CLEAR    Glucose, Ur 3+ (A) NEGATIVE    Bilirubin Urine NEGATIVE NEGATIVE    Ketones, Urine TRACE (A) NEGATIVE    Specific Gravity, UA 1.025 1.005 - 1.030    Urine Hgb NEGATIVE NEGATIVE    pH, UA 5.0 5.0 - Views)    Result Date: 3/21/2020  Sequential images of the right shoulder demonstrating eventual reduction of the right shoulder. Large displaced fracture along the superior margin of the humeral head. Xr Humerus Right (min 2 Views)    Result Date: 3/21/2020  Anterior subcoracoid humeral head dislocation. Prominent fracture fragment projecting near the inferior glenoid suspicious for a Hill-Sachs defect with possible involvement of the greater tuberosity. Ct Shoulder Right Wo Contrast    Result Date: 3/21/2020  Comminuted impacted proximal humerus fracture centered at the humeral head-neck junction. There is a dominant displaced fracture fragment superiorly likely representing the greater tuberosity. The humeral head articular surface is rotated approximately 90 degrees anteriorly and superiorly with the majority of articular surface not articulating with the glenoid. Subtle concavity of the inferior glenoid could represent an impaction fracture. No displaced glenoid fracture is seen. Fat stranding in the right axilla compatible with a hematoma. Xr Chest Portable    Result Date: 3/21/2020  No acute process. Ct 3d Reconstruction    Result Date: 3/21/2020  Comminuted impacted proximal humerus fracture centered at the humeral head-neck junction. There is a dominant displaced fracture fragment superiorly likely representing the greater tuberosity. The humeral head articular surface is rotated approximately 90 degrees anteriorly and superiorly with the majority of articular surface not articulating with the glenoid. Subtle concavity of the inferior glenoid could represent an impaction fracture. No displaced glenoid fracture is seen. Fat stranding in the right axilla compatible with a hematoma.        Assessment :      Hospital Problems           Last Modified POA    * (Principal) Shoulder fracture, right, closed, initial encounter 3/22/2020 Yes    Diabetes mellitus ([address] 3/21/2020 Yes    Mixed hyperlipidemia 3/21/2020 Yes    Closed dislocation of right shoulder 3/21/2020 Yes    Essential hypertension 3/21/2020 Yes          Plan:     Patient status inpatient in the Med/Surge    1. Shoulder Fracture-- Ortho consulted, plan for surgical intervention on Monday am, sling in place  2. DMII-- continue home medications including insulin; monitor for glycemic control  3. HTN-- continue home medications, stop carvedilol as patient taken off this med via cardiologist as she developed hypotension and severe bradycardia  4. Prn pain control as ordered  5. Orthopedic precautions as noted  6. Daily labs and add BNP and Trop this am, had EKG done on admission has history of left BBB  7. DVT prophylaxis-- Lovenox verify ok with ortho to start  8. POC discussed with patient, continue to monitor    Consultations:   [id number] [place] TO HOSPITALIST     Patient is admitted as inpatient status because of co-morbidities listed above, severity of signs and symptoms as outlined, requirement for current medical therapies and most importantly because of direct risk to patient if care not provided in a hospital setting.     Nithin Lowery, APRN - NP  3/22/2020  3:42 AM    Copy sent to Dr. Gold Marlow MD

## 2020-03-22 NOTE — CARE COORDINATION
Case Management Initial Discharge Plan  Boo Porter,             Met with:patient to discuss discharge plans. Information verified: address, contacts, phone number, , insurance Yes  PCP: Linnea Magdaleno MD  Date of last visit: NOvember    Insurance Provider: medicare    Discharge Planning    Living Arrangements:  Spouse/Significant Other   Support Systems:  Family Members, Spouse/Significant Other, Children    Home has 2 stories  2 stairs to climb to get into front door, 14stairs to climb to reach second floor  Location of bedroom/bathroom in home main bed and bath    Patient able to perform ADL's:Independent    Current Services (outpatient & in home) none  DME equipment: glucometer  DME provider:       Potential Assistance Needed:  N/A    Patient agreeable to home care: My dtr is an RN  Freedom of choice provided:  n/a    Prior SNF/Rehab Placement and Facility: none  Agreeable to SNF/Rehab: No  [place] of choice provided: n/a   Evaluation: yes    Expected Discharge date:  20  Patient expects to be discharged to:  home  Follow Up Appointment: Best Day/ Time: Tuesday AM    Transportation provider: dtr Fidelina Gan or her spouse if he is feeling better  Transportation arrangements needed for discharge: No    Readmission Risk              Risk of Unplanned Readmission:        9             Does patient have a readmission risk score greater than 14?: No  If yes, follow-up appointment must be made within 7 days of discharge.      Goals of Care: resume adls      Discharge Plan: Plan OR Monday goal is home has spouse at home and two daughters and a son that can help          Electronically signed by Bucky Dash RN on 3/22/20 at 4:19 PM EDT

## 2020-03-23 ENCOUNTER — APPOINTMENT (OUTPATIENT)
Dept: GENERAL RADIOLOGY | Age: 70
DRG: 483 | End: 2020-03-23
Payer: MEDICARE

## 2020-03-23 ENCOUNTER — ANESTHESIA (OUTPATIENT)
Dept: OPERATING ROOM | Age: 70
DRG: 483 | End: 2020-03-23
Payer: MEDICARE

## 2020-03-23 ENCOUNTER — ANESTHESIA EVENT (OUTPATIENT)
Dept: OPERATING ROOM | Age: 70
DRG: 483 | End: 2020-03-23
Payer: MEDICARE

## 2020-03-23 VITALS — SYSTOLIC BLOOD PRESSURE: 171 MMHG | DIASTOLIC BLOOD PRESSURE: 87 MMHG | TEMPERATURE: 100.5 F | OXYGEN SATURATION: 91 %

## 2020-03-23 LAB
ANION GAP SERPL CALCULATED.3IONS-SCNC: 12 MMOL/L (ref 9–17)
ANION GAP SERPL CALCULATED.3IONS-SCNC: 20 MMOL/L (ref 9–17)
BETA-HYDROXYBUTYRATE: 2.65 MMOL/L (ref 0.02–0.27)
BUN BLDV-MCNC: 33 MG/DL (ref 8–23)
BUN BLDV-MCNC: 35 MG/DL (ref 8–23)
BUN/CREAT BLD: ABNORMAL (ref 9–20)
BUN/CREAT BLD: ABNORMAL (ref 9–20)
CALCIUM SERPL-MCNC: 8.2 MG/DL (ref 8.6–10.4)
CALCIUM SERPL-MCNC: 8.4 MG/DL (ref 8.6–10.4)
CHLORIDE BLD-SCNC: 101 MMOL/L (ref 98–107)
CHLORIDE BLD-SCNC: 97 MMOL/L (ref 98–107)
CO2: 13 MMOL/L (ref 20–31)
CO2: 19 MMOL/L (ref 20–31)
CREAT SERPL-MCNC: 1.61 MG/DL (ref 0.5–0.9)
CREAT SERPL-MCNC: 1.7 MG/DL (ref 0.5–0.9)
EKG ATRIAL RATE: 52 BPM
EKG ATRIAL RATE: 60 BPM
EKG Q-T INTERVAL: 430 MS
EKG Q-T INTERVAL: 466 MS
EKG QRS DURATION: 120 MS
EKG QRS DURATION: 126 MS
EKG QTC CALCULATION (BAZETT): 430 MS
EKG QTC CALCULATION (BAZETT): 441 MS
EKG R AXIS: -11 DEGREES
EKG R AXIS: -15 DEGREES
EKG T AXIS: 40 DEGREES
EKG T AXIS: 55 DEGREES
EKG VENTRICULAR RATE: 54 BPM
EKG VENTRICULAR RATE: 60 BPM
GFR AFRICAN AMERICAN: 36 ML/MIN
GFR AFRICAN AMERICAN: 39 ML/MIN
GFR NON-AFRICAN AMERICAN: 30 ML/MIN
GFR NON-AFRICAN AMERICAN: 32 ML/MIN
GFR SERPL CREATININE-BSD FRML MDRD: ABNORMAL ML/MIN/{1.73_M2}
GLUCOSE BLD-MCNC: 141 MG/DL (ref 65–105)
GLUCOSE BLD-MCNC: 175 MG/DL (ref 65–105)
GLUCOSE BLD-MCNC: 228 MG/DL (ref 65–105)
GLUCOSE BLD-MCNC: 239 MG/DL (ref 70–99)
GLUCOSE BLD-MCNC: 268 MG/DL (ref 65–105)
GLUCOSE BLD-MCNC: 290 MG/DL (ref 65–105)
GLUCOSE BLD-MCNC: 293 MG/DL (ref 65–105)
GLUCOSE BLD-MCNC: 297 MG/DL (ref 65–105)
GLUCOSE BLD-MCNC: 332 MG/DL (ref 70–99)
LACTIC ACID, WHOLE BLOOD: 1.9 MMOL/L (ref 0.7–2.1)
LV EF: 62 %
LVEF MODALITY: NORMAL
MAGNESIUM: 1.7 MG/DL (ref 1.6–2.6)
MYOGLOBIN: 94 NG/ML (ref 25–58)
PHOSPHORUS: 4.4 MG/DL (ref 2.6–4.5)
POTASSIUM SERPL-SCNC: 4.4 MMOL/L (ref 3.7–5.3)
POTASSIUM SERPL-SCNC: 5.4 MMOL/L (ref 3.7–5.3)
SODIUM BLD-SCNC: 130 MMOL/L (ref 135–144)
SODIUM BLD-SCNC: 132 MMOL/L (ref 135–144)
TROPONIN INTERP: ABNORMAL
TROPONIN T: ABNORMAL NG/ML
TROPONIN, HIGH SENSITIVITY: 13 NG/L (ref 0–14)

## 2020-03-23 PROCEDURE — 6360000002 HC RX W HCPCS

## 2020-03-23 PROCEDURE — C1713 ANCHOR/SCREW BN/BN,TIS/BN: HCPCS | Performed by: ORTHOPAEDIC SURGERY

## 2020-03-23 PROCEDURE — 93010 ELECTROCARDIOGRAM REPORT: CPT | Performed by: INTERNAL MEDICINE

## 2020-03-23 PROCEDURE — 3700000000 HC ANESTHESIA ATTENDED CARE: Performed by: ORTHOPAEDIC SURGERY

## 2020-03-23 PROCEDURE — 99232 SBSQ HOSP IP/OBS MODERATE 35: CPT | Performed by: FAMILY MEDICINE

## 2020-03-23 PROCEDURE — C1776 JOINT DEVICE (IMPLANTABLE): HCPCS | Performed by: ORTHOPAEDIC SURGERY

## 2020-03-23 PROCEDURE — 2060000000 HC ICU INTERMEDIATE R&B

## 2020-03-23 PROCEDURE — 6360000002 HC RX W HCPCS: Performed by: NURSE ANESTHETIST, CERTIFIED REGISTERED

## 2020-03-23 PROCEDURE — 6370000000 HC RX 637 (ALT 250 FOR IP): Performed by: NURSE PRACTITIONER

## 2020-03-23 PROCEDURE — 3600000005 HC SURGERY LEVEL 5 BASE: Performed by: ORTHOPAEDIC SURGERY

## 2020-03-23 PROCEDURE — 3E0T3BZ INTRODUCTION OF ANESTHETIC AGENT INTO PERIPHERAL NERVES AND PLEXI, PERCUTANEOUS APPROACH: ICD-10-PCS | Performed by: ANESTHESIOLOGY

## 2020-03-23 PROCEDURE — 6370000000 HC RX 637 (ALT 250 FOR IP): Performed by: STUDENT IN AN ORGANIZED HEALTH CARE EDUCATION/TRAINING PROGRAM

## 2020-03-23 PROCEDURE — 82010 KETONE BODYS QUAN: CPT

## 2020-03-23 PROCEDURE — 0RRJ00Z REPLACEMENT OF RIGHT SHOULDER JOINT WITH REVERSE BALL AND SOCKET SYNTHETIC SUBSTITUTE, OPEN APPROACH: ICD-10-PCS | Performed by: ORTHOPAEDIC SURGERY

## 2020-03-23 PROCEDURE — 2580000003 HC RX 258: Performed by: FAMILY MEDICINE

## 2020-03-23 PROCEDURE — 2709999900 HC NON-CHARGEABLE SUPPLY: Performed by: ORTHOPAEDIC SURGERY

## 2020-03-23 PROCEDURE — 7100000000 HC PACU RECOVERY - FIRST 15 MIN: Performed by: ORTHOPAEDIC SURGERY

## 2020-03-23 PROCEDURE — 6360000002 HC RX W HCPCS: Performed by: STUDENT IN AN ORGANIZED HEALTH CARE EDUCATION/TRAINING PROGRAM

## 2020-03-23 PROCEDURE — 2580000003 HC RX 258: Performed by: NURSE ANESTHETIST, CERTIFIED REGISTERED

## 2020-03-23 PROCEDURE — 83735 ASSAY OF MAGNESIUM: CPT

## 2020-03-23 PROCEDURE — 80048 BASIC METABOLIC PNL TOTAL CA: CPT

## 2020-03-23 PROCEDURE — 2500000003 HC RX 250 WO HCPCS: Performed by: ANESTHESIOLOGY

## 2020-03-23 PROCEDURE — 36415 COLL VENOUS BLD VENIPUNCTURE: CPT

## 2020-03-23 PROCEDURE — 64415 NJX AA&/STRD BRCH PLXS IMG: CPT | Performed by: ANESTHESIOLOGY

## 2020-03-23 PROCEDURE — 71045 X-RAY EXAM CHEST 1 VIEW: CPT

## 2020-03-23 PROCEDURE — 82947 ASSAY GLUCOSE BLOOD QUANT: CPT

## 2020-03-23 PROCEDURE — 2580000003 HC RX 258: Performed by: STUDENT IN AN ORGANIZED HEALTH CARE EDUCATION/TRAINING PROGRAM

## 2020-03-23 PROCEDURE — 2720000010 HC SURG SUPPLY STERILE: Performed by: ORTHOPAEDIC SURGERY

## 2020-03-23 PROCEDURE — 6370000000 HC RX 637 (ALT 250 FOR IP): Performed by: FAMILY MEDICINE

## 2020-03-23 PROCEDURE — 6370000000 HC RX 637 (ALT 250 FOR IP): Performed by: NURSE ANESTHETIST, CERTIFIED REGISTERED

## 2020-03-23 PROCEDURE — 23472 RECONSTRUCT SHOULDER JOINT: CPT | Performed by: ORTHOPAEDIC SURGERY

## 2020-03-23 PROCEDURE — 2580000003 HC RX 258: Performed by: ORTHOPAEDIC SURGERY

## 2020-03-23 PROCEDURE — 93306 TTE W/DOPPLER COMPLETE: CPT

## 2020-03-23 PROCEDURE — 2580000003 HC RX 258: Performed by: ANESTHESIOLOGY

## 2020-03-23 PROCEDURE — 3700000001 HC ADD 15 MINUTES (ANESTHESIA): Performed by: ORTHOPAEDIC SURGERY

## 2020-03-23 PROCEDURE — L3650 SO 8 ABD RESTRAINT PRE OTS: HCPCS | Performed by: ORTHOPAEDIC SURGERY

## 2020-03-23 PROCEDURE — 3600000015 HC SURGERY LEVEL 5 ADDTL 15MIN: Performed by: ORTHOPAEDIC SURGERY

## 2020-03-23 PROCEDURE — 6360000002 HC RX W HCPCS: Performed by: FAMILY MEDICINE

## 2020-03-23 PROCEDURE — 7100000001 HC PACU RECOVERY - ADDTL 15 MIN: Performed by: ORTHOPAEDIC SURGERY

## 2020-03-23 PROCEDURE — 73030 X-RAY EXAM OF SHOULDER: CPT

## 2020-03-23 PROCEDURE — 6360000002 HC RX W HCPCS: Performed by: ANESTHESIOLOGY

## 2020-03-23 PROCEDURE — 84100 ASSAY OF PHOSPHORUS: CPT

## 2020-03-23 PROCEDURE — 2500000003 HC RX 250 WO HCPCS: Performed by: NURSE ANESTHETIST, CERTIFIED REGISTERED

## 2020-03-23 PROCEDURE — 83605 ASSAY OF LACTIC ACID: CPT

## 2020-03-23 DEVICE — GLENOID, HEAD W/RETAINING SCREW, RSP, 32MM/-4MM
Type: IMPLANTABLE DEVICE | Site: SHOULDER | Status: FUNCTIONAL
Brand: DJO SURGICAL

## 2020-03-23 DEVICE — PLUG, CEMENT SZ. 8.0
Type: IMPLANTABLE DEVICE | Site: SHOULDER | Status: FUNCTIONAL
Brand: DJO SURGICAL

## 2020-03-23 DEVICE — Z DUP USE 2196579 INSERT HUM L32MM MONOBLOCK STD SOCK RSP HXE +: Type: IMPLANTABLE DEVICE | Site: SHOULDER | Status: FUNCTIONAL

## 2020-03-23 DEVICE — BASEPLATE, GLENOID HA-COAT, RSP, 6.5MM X 30MM
Type: IMPLANTABLE DEVICE | Site: SHOULDER | Status: FUNCTIONAL
Brand: DJO SURGICAL

## 2020-03-23 DEVICE — Z  INACTIVE USE 2750024 CEMENT BONE 40GM W/ GENTMYCN HI VISC PALACOS R+G: Type: IMPLANTABLE DEVICE | Site: SHOULDER | Status: FUNCTIONAL

## 2020-03-23 DEVICE — COMPONENT TOT SHLDR CAPPED ADV REV: Type: IMPLANTABLE DEVICE | Status: FUNCTIONAL

## 2020-03-23 DEVICE — SCREW, LOCKING BONE, RSP, 5X14
Type: IMPLANTABLE DEVICE | Site: SHOULDER | Status: FUNCTIONAL
Brand: DJO SURGICAL

## 2020-03-23 DEVICE — SCREW, LOCKING BONE, RSP, 5X22
Type: IMPLANTABLE DEVICE | Site: SHOULDER | Status: FUNCTIONAL
Brand: DJO SURGICAL

## 2020-03-23 DEVICE — IMPLANTABLE DEVICE: Type: IMPLANTABLE DEVICE | Site: SHOULDER | Status: FUNCTIONAL

## 2020-03-23 RX ORDER — DEXAMETHASONE SODIUM PHOSPHATE 10 MG/ML
INJECTION INTRAMUSCULAR; INTRAVENOUS PRN
Status: DISCONTINUED | OUTPATIENT
Start: 2020-03-23 | End: 2020-03-23 | Stop reason: SDUPTHER

## 2020-03-23 RX ORDER — FENTANYL CITRATE 50 UG/ML
50 INJECTION, SOLUTION INTRAMUSCULAR; INTRAVENOUS ONCE
Status: COMPLETED | OUTPATIENT
Start: 2020-03-23 | End: 2020-03-23

## 2020-03-23 RX ORDER — KETOROLAC TROMETHAMINE 30 MG/ML
30 INJECTION, SOLUTION INTRAMUSCULAR; INTRAVENOUS EVERY 8 HOURS
Status: COMPLETED | OUTPATIENT
Start: 2020-03-23 | End: 2020-03-23

## 2020-03-23 RX ORDER — ROCURONIUM BROMIDE 10 MG/ML
INJECTION, SOLUTION INTRAVENOUS PRN
Status: DISCONTINUED | OUTPATIENT
Start: 2020-03-23 | End: 2020-03-23 | Stop reason: SDUPTHER

## 2020-03-23 RX ORDER — PROMETHAZINE HYDROCHLORIDE 25 MG/ML
6.25 INJECTION, SOLUTION INTRAMUSCULAR; INTRAVENOUS
Status: DISCONTINUED | OUTPATIENT
Start: 2020-03-23 | End: 2020-03-23 | Stop reason: HOSPADM

## 2020-03-23 RX ORDER — ACETAMINOPHEN 500 MG
1000 TABLET ORAL EVERY 6 HOURS
Status: DISCONTINUED | OUTPATIENT
Start: 2020-03-23 | End: 2020-03-26 | Stop reason: HOSPADM

## 2020-03-23 RX ORDER — PROPOFOL 10 MG/ML
INJECTION, EMULSION INTRAVENOUS PRN
Status: DISCONTINUED | OUTPATIENT
Start: 2020-03-23 | End: 2020-03-23 | Stop reason: SDUPTHER

## 2020-03-23 RX ORDER — MAGNESIUM SULFATE 1 G/100ML
1 INJECTION INTRAVENOUS PRN
Status: DISCONTINUED | OUTPATIENT
Start: 2020-03-23 | End: 2020-03-26 | Stop reason: HOSPADM

## 2020-03-23 RX ORDER — MIDAZOLAM HYDROCHLORIDE 1 MG/ML
1 INJECTION INTRAMUSCULAR; INTRAVENOUS ONCE
Status: COMPLETED | OUTPATIENT
Start: 2020-03-23 | End: 2020-03-23

## 2020-03-23 RX ORDER — PHENYLEPHRINE HYDROCHLORIDE 10 MG/ML
INJECTION INTRAVENOUS PRN
Status: DISCONTINUED | OUTPATIENT
Start: 2020-03-23 | End: 2020-03-23 | Stop reason: SDUPTHER

## 2020-03-23 RX ORDER — ALBUTEROL SULFATE 90 UG/1
AEROSOL, METERED RESPIRATORY (INHALATION) PRN
Status: DISCONTINUED | OUTPATIENT
Start: 2020-03-23 | End: 2020-03-23 | Stop reason: SDUPTHER

## 2020-03-23 RX ORDER — LABETALOL HYDROCHLORIDE 5 MG/ML
10 INJECTION, SOLUTION INTRAVENOUS
Status: DISCONTINUED | OUTPATIENT
Start: 2020-03-23 | End: 2020-03-23 | Stop reason: HOSPADM

## 2020-03-23 RX ORDER — FENTANYL CITRATE 50 UG/ML
100 INJECTION, SOLUTION INTRAMUSCULAR; INTRAVENOUS ONCE
Status: DISCONTINUED | OUTPATIENT
Start: 2020-03-23 | End: 2020-03-23

## 2020-03-23 RX ORDER — SODIUM CHLORIDE, SODIUM LACTATE, POTASSIUM CHLORIDE, CALCIUM CHLORIDE 600; 310; 30; 20 MG/100ML; MG/100ML; MG/100ML; MG/100ML
INJECTION, SOLUTION INTRAVENOUS CONTINUOUS
Status: DISCONTINUED | OUTPATIENT
Start: 2020-03-23 | End: 2020-03-23

## 2020-03-23 RX ORDER — POTASSIUM CHLORIDE 7.45 MG/ML
10 INJECTION INTRAVENOUS PRN
Status: DISCONTINUED | OUTPATIENT
Start: 2020-03-23 | End: 2020-03-26 | Stop reason: HOSPADM

## 2020-03-23 RX ORDER — 0.9 % SODIUM CHLORIDE 0.9 %
500 INTRAVENOUS SOLUTION INTRAVENOUS ONCE
Status: COMPLETED | OUTPATIENT
Start: 2020-03-23 | End: 2020-03-23

## 2020-03-23 RX ORDER — GLYCOPYRROLATE 1 MG/5 ML
SYRINGE (ML) INTRAVENOUS PRN
Status: DISCONTINUED | OUTPATIENT
Start: 2020-03-23 | End: 2020-03-23 | Stop reason: SDUPTHER

## 2020-03-23 RX ORDER — OXYCODONE HYDROCHLORIDE 5 MG/1
5 TABLET ORAL EVERY 4 HOURS PRN
Status: DISCONTINUED | OUTPATIENT
Start: 2020-03-23 | End: 2020-03-26 | Stop reason: HOSPADM

## 2020-03-23 RX ORDER — FENTANYL CITRATE 50 UG/ML
INJECTION, SOLUTION INTRAMUSCULAR; INTRAVENOUS PRN
Status: DISCONTINUED | OUTPATIENT
Start: 2020-03-23 | End: 2020-03-23 | Stop reason: SDUPTHER

## 2020-03-23 RX ORDER — SODIUM CHLORIDE 9 MG/ML
INJECTION, SOLUTION INTRAVENOUS CONTINUOUS
Status: DISCONTINUED | OUTPATIENT
Start: 2020-03-23 | End: 2020-03-24

## 2020-03-23 RX ORDER — ERGOCALCIFEROL 1.25 MG/1
50000 CAPSULE ORAL WEEKLY
Qty: 8 CAPSULE | Refills: 0 | Status: SHIPPED | OUTPATIENT
Start: 2020-03-23 | End: 2020-05-12

## 2020-03-23 RX ORDER — MAGNESIUM HYDROXIDE 1200 MG/15ML
LIQUID ORAL CONTINUOUS PRN
Status: COMPLETED | OUTPATIENT
Start: 2020-03-23 | End: 2020-03-23

## 2020-03-23 RX ORDER — MIDAZOLAM HYDROCHLORIDE 1 MG/ML
INJECTION INTRAMUSCULAR; INTRAVENOUS
Status: COMPLETED
Start: 2020-03-23 | End: 2020-03-23

## 2020-03-23 RX ORDER — SODIUM CHLORIDE 9 MG/ML
INJECTION INTRAVENOUS PRN
Status: DISCONTINUED | OUTPATIENT
Start: 2020-03-23 | End: 2020-03-23 | Stop reason: SDUPTHER

## 2020-03-23 RX ORDER — ONDANSETRON 2 MG/ML
INJECTION INTRAMUSCULAR; INTRAVENOUS PRN
Status: DISCONTINUED | OUTPATIENT
Start: 2020-03-23 | End: 2020-03-23 | Stop reason: SDUPTHER

## 2020-03-23 RX ORDER — BUPIVACAINE HYDROCHLORIDE 5 MG/ML
INJECTION, SOLUTION EPIDURAL; INTRACAUDAL
Status: COMPLETED | OUTPATIENT
Start: 2020-03-23 | End: 2020-03-23

## 2020-03-23 RX ORDER — MIDAZOLAM HYDROCHLORIDE 1 MG/ML
2 INJECTION INTRAMUSCULAR; INTRAVENOUS ONCE
Status: DISCONTINUED | OUTPATIENT
Start: 2020-03-23 | End: 2020-03-23

## 2020-03-23 RX ORDER — LIDOCAINE HYDROCHLORIDE 10 MG/ML
INJECTION, SOLUTION EPIDURAL; INFILTRATION; INTRACAUDAL; PERINEURAL PRN
Status: DISCONTINUED | OUTPATIENT
Start: 2020-03-23 | End: 2020-03-23 | Stop reason: SDUPTHER

## 2020-03-23 RX ORDER — DEXTROSE MONOHYDRATE 25 G/50ML
12.5 INJECTION, SOLUTION INTRAVENOUS PRN
Status: DISCONTINUED | OUTPATIENT
Start: 2020-03-23 | End: 2020-03-26 | Stop reason: HOSPADM

## 2020-03-23 RX ORDER — DEXTROSE AND SODIUM CHLORIDE 5; .45 G/100ML; G/100ML
INJECTION, SOLUTION INTRAVENOUS CONTINUOUS PRN
Status: DISCONTINUED | OUTPATIENT
Start: 2020-03-23 | End: 2020-03-26

## 2020-03-23 RX ORDER — OXYCODONE HYDROCHLORIDE 5 MG/1
10 TABLET ORAL EVERY 4 HOURS PRN
Status: DISCONTINUED | OUTPATIENT
Start: 2020-03-23 | End: 2020-03-26 | Stop reason: HOSPADM

## 2020-03-23 RX ORDER — NEOSTIGMINE METHYLSULFATE 5 MG/5 ML
SYRINGE (ML) INTRAVENOUS PRN
Status: DISCONTINUED | OUTPATIENT
Start: 2020-03-23 | End: 2020-03-23 | Stop reason: SDUPTHER

## 2020-03-23 RX ORDER — HYDROCODONE BITARTRATE AND ACETAMINOPHEN 5; 325 MG/1; MG/1
1 TABLET ORAL EVERY 6 HOURS PRN
Qty: 28 TABLET | Refills: 0 | Status: SHIPPED | OUTPATIENT
Start: 2020-03-23 | End: 2020-03-30

## 2020-03-23 RX ORDER — BUPIVACAINE HYDROCHLORIDE 5 MG/ML
30 INJECTION, SOLUTION EPIDURAL; INTRACAUDAL ONCE
Status: COMPLETED | OUTPATIENT
Start: 2020-03-23 | End: 2020-03-23

## 2020-03-23 RX ADMIN — ALBUTEROL SULFATE 4 PUFF: 90 AEROSOL, METERED RESPIRATORY (INHALATION) at 14:16

## 2020-03-23 RX ADMIN — FENTANYL CITRATE 50 MCG: 50 INJECTION INTRAMUSCULAR; INTRAVENOUS at 09:41

## 2020-03-23 RX ADMIN — PHENYLEPHRINE HYDROCHLORIDE 100 MCG/MIN: 10 INJECTION INTRAVENOUS at 10:30

## 2020-03-23 RX ADMIN — ACETAMINOPHEN 1000 MG: 500 TABLET ORAL at 23:36

## 2020-03-23 RX ADMIN — Medication 1 G: at 13:40

## 2020-03-23 RX ADMIN — ALBUTEROL SULFATE 4 PUFF: 90 AEROSOL, METERED RESPIRATORY (INHALATION) at 10:08

## 2020-03-23 RX ADMIN — SODIUM CHLORIDE 9.53 UNITS/HR: 9 INJECTION, SOLUTION INTRAVENOUS at 20:59

## 2020-03-23 RX ADMIN — CEFAZOLIN 2 G: 10 INJECTION, POWDER, FOR SOLUTION INTRAVENOUS at 10:30

## 2020-03-23 RX ADMIN — DEXTROSE AND SODIUM CHLORIDE: 5; 450 INJECTION, SOLUTION INTRAVENOUS at 22:40

## 2020-03-23 RX ADMIN — DEXAMETHASONE SODIUM PHOSPHATE 10 MG: 10 INJECTION INTRAMUSCULAR; INTRAVENOUS at 10:10

## 2020-03-23 RX ADMIN — INSULIN LISPRO 3 UNITS: 100 INJECTION, SOLUTION INTRAVENOUS; SUBCUTANEOUS at 16:53

## 2020-03-23 RX ADMIN — SODIUM CHLORIDE 500 ML: 9 INJECTION, SOLUTION INTRAVENOUS at 21:00

## 2020-03-23 RX ADMIN — ONDANSETRON 4 MG: 2 INJECTION, SOLUTION INTRAMUSCULAR; INTRAVENOUS at 13:45

## 2020-03-23 RX ADMIN — INSULIN HUMAN 10 UNITS: 100 INJECTION, SOLUTION PARENTERAL at 18:59

## 2020-03-23 RX ADMIN — PHENYLEPHRINE HYDROCHLORIDE 100 MCG: 10 INJECTION INTRAVENOUS at 10:26

## 2020-03-23 RX ADMIN — OXYCODONE HYDROCHLORIDE AND ACETAMINOPHEN 2 TABLET: 5; 325 TABLET ORAL at 04:34

## 2020-03-23 RX ADMIN — SODIUM CHLORIDE: 9 INJECTION, SOLUTION INTRAVENOUS at 21:30

## 2020-03-23 RX ADMIN — KETOROLAC TROMETHAMINE 30 MG: 30 INJECTION, SOLUTION INTRAMUSCULAR at 16:52

## 2020-03-23 RX ADMIN — ACETAMINOPHEN 1000 MG: 500 TABLET ORAL at 16:52

## 2020-03-23 RX ADMIN — SODIUM CHLORIDE, POTASSIUM CHLORIDE, SODIUM LACTATE AND CALCIUM CHLORIDE: 600; 310; 30; 20 INJECTION, SOLUTION INTRAVENOUS at 09:08

## 2020-03-23 RX ADMIN — FENTANYL CITRATE 50 MCG: 50 INJECTION INTRAMUSCULAR; INTRAVENOUS at 10:00

## 2020-03-23 RX ADMIN — ROCURONIUM BROMIDE 100 MG: 10 INJECTION INTRAVENOUS at 10:00

## 2020-03-23 RX ADMIN — Medication 1 G: at 10:35

## 2020-03-23 RX ADMIN — PHENYLEPHRINE HYDROCHLORIDE 100 MCG: 10 INJECTION INTRAVENOUS at 10:52

## 2020-03-23 RX ADMIN — CEFAZOLIN 2 G: 10 INJECTION, POWDER, FOR SOLUTION INTRAVENOUS at 13:31

## 2020-03-23 RX ADMIN — Medication 0.8 MG: at 14:02

## 2020-03-23 RX ADMIN — MIDAZOLAM HYDROCHLORIDE 1 MG: 1 INJECTION INTRAMUSCULAR; INTRAVENOUS at 09:41

## 2020-03-23 RX ADMIN — Medication 4 MG: at 14:02

## 2020-03-23 RX ADMIN — Medication 10 MG: at 14:58

## 2020-03-23 RX ADMIN — POTASSIUM CHLORIDE 10 MEQ: 7.46 INJECTION, SOLUTION INTRAVENOUS at 23:48

## 2020-03-23 RX ADMIN — BUPIVACAINE HYDROCHLORIDE 30 ML: 5 INJECTION, SOLUTION EPIDURAL; INTRACAUDAL; PERINEURAL at 09:48

## 2020-03-23 RX ADMIN — LIDOCAINE HYDROCHLORIDE 50 MG: 10 INJECTION, SOLUTION EPIDURAL; INFILTRATION; INTRACAUDAL; PERINEURAL at 10:00

## 2020-03-23 RX ADMIN — FENTANYL CITRATE 50 MCG: 50 INJECTION INTRAMUSCULAR; INTRAVENOUS at 11:15

## 2020-03-23 RX ADMIN — PHENYLEPHRINE HYDROCHLORIDE 100 MCG: 10 INJECTION INTRAVENOUS at 10:08

## 2020-03-23 RX ADMIN — METFORMIN HYDROCHLORIDE 500 MG: 500 TABLET ORAL at 16:52

## 2020-03-23 RX ADMIN — Medication 0.2 MG: at 10:08

## 2020-03-23 RX ADMIN — SODIUM CHLORIDE 10 ML: 9 INJECTION INTRAMUSCULAR; INTRAVENOUS; SUBCUTANEOUS at 10:08

## 2020-03-23 RX ADMIN — Medication 150 MG: at 09:49

## 2020-03-23 RX ADMIN — MIDAZOLAM HYDROCHLORIDE 1 MG: 1 INJECTION, SOLUTION INTRAMUSCULAR; INTRAVENOUS at 09:41

## 2020-03-23 RX ADMIN — SODIUM CHLORIDE, POTASSIUM CHLORIDE, SODIUM LACTATE AND CALCIUM CHLORIDE: 600; 310; 30; 20 INJECTION, SOLUTION INTRAVENOUS at 18:48

## 2020-03-23 RX ADMIN — PROPOFOL 150 MG: 10 INJECTION, EMULSION INTRAVENOUS at 10:00

## 2020-03-23 ASSESSMENT — PULMONARY FUNCTION TESTS
PIF_VALUE: 20
PIF_VALUE: 21
PIF_VALUE: 21
PIF_VALUE: 12
PIF_VALUE: 22
PIF_VALUE: 20
PIF_VALUE: 20
PIF_VALUE: 17
PIF_VALUE: 21
PIF_VALUE: 22
PIF_VALUE: 20
PIF_VALUE: 20
PIF_VALUE: 23
PIF_VALUE: 2
PIF_VALUE: 19
PIF_VALUE: 21
PIF_VALUE: 20
PIF_VALUE: 21
PIF_VALUE: 21
PIF_VALUE: 20
PIF_VALUE: 23
PIF_VALUE: 22
PIF_VALUE: 21
PIF_VALUE: 19
PIF_VALUE: 21
PIF_VALUE: 13
PIF_VALUE: 20
PIF_VALUE: 14
PIF_VALUE: 48
PIF_VALUE: 21
PIF_VALUE: 21
PIF_VALUE: 23
PIF_VALUE: 19
PIF_VALUE: 22
PIF_VALUE: 20
PIF_VALUE: 22
PIF_VALUE: 24
PIF_VALUE: 20
PIF_VALUE: 20
PIF_VALUE: 19
PIF_VALUE: 19
PIF_VALUE: 23
PIF_VALUE: 20
PIF_VALUE: 23
PIF_VALUE: 20
PIF_VALUE: 19
PIF_VALUE: 22
PIF_VALUE: 22
PIF_VALUE: 1
PIF_VALUE: 1
PIF_VALUE: 20
PIF_VALUE: 21
PIF_VALUE: 24
PIF_VALUE: 20
PIF_VALUE: 21
PIF_VALUE: 21
PIF_VALUE: 23
PIF_VALUE: 20
PIF_VALUE: 23
PIF_VALUE: 20
PIF_VALUE: 20
PIF_VALUE: 21
PIF_VALUE: 19
PIF_VALUE: 19
PIF_VALUE: 20
PIF_VALUE: 23
PIF_VALUE: 20
PIF_VALUE: 22
PIF_VALUE: 20
PIF_VALUE: 20
PIF_VALUE: 22
PIF_VALUE: 22
PIF_VALUE: 21
PIF_VALUE: 25
PIF_VALUE: 21
PIF_VALUE: 20
PIF_VALUE: 21
PIF_VALUE: 20
PIF_VALUE: 21
PIF_VALUE: 19
PIF_VALUE: 21
PIF_VALUE: 19
PIF_VALUE: 20
PIF_VALUE: 21
PIF_VALUE: 20
PIF_VALUE: 4
PIF_VALUE: 1
PIF_VALUE: 20
PIF_VALUE: 21
PIF_VALUE: 27
PIF_VALUE: 22
PIF_VALUE: 9
PIF_VALUE: 21
PIF_VALUE: 12
PIF_VALUE: 23
PIF_VALUE: 20
PIF_VALUE: 21
PIF_VALUE: 19
PIF_VALUE: 20
PIF_VALUE: 21
PIF_VALUE: 2
PIF_VALUE: 20
PIF_VALUE: 21
PIF_VALUE: 19
PIF_VALUE: 0
PIF_VALUE: 21
PIF_VALUE: 20
PIF_VALUE: 20
PIF_VALUE: 23
PIF_VALUE: 21
PIF_VALUE: 21
PIF_VALUE: 20
PIF_VALUE: 21
PIF_VALUE: 21
PIF_VALUE: 14
PIF_VALUE: 21
PIF_VALUE: 12
PIF_VALUE: 13
PIF_VALUE: 14
PIF_VALUE: 12
PIF_VALUE: 20
PIF_VALUE: 17
PIF_VALUE: 5
PIF_VALUE: 20
PIF_VALUE: 21
PIF_VALUE: 4
PIF_VALUE: 20
PIF_VALUE: 1
PIF_VALUE: 21
PIF_VALUE: 21
PIF_VALUE: 31
PIF_VALUE: 22
PIF_VALUE: 21
PIF_VALUE: 20
PIF_VALUE: 20
PIF_VALUE: 12
PIF_VALUE: 19
PIF_VALUE: 22
PIF_VALUE: 20
PIF_VALUE: 19
PIF_VALUE: 19
PIF_VALUE: 21
PIF_VALUE: 19
PIF_VALUE: 21
PIF_VALUE: 20
PIF_VALUE: 21
PIF_VALUE: 19
PIF_VALUE: 28
PIF_VALUE: 20
PIF_VALUE: 20
PIF_VALUE: 19
PIF_VALUE: 22
PIF_VALUE: 22
PIF_VALUE: 1
PIF_VALUE: 34
PIF_VALUE: 22
PIF_VALUE: 2
PIF_VALUE: 20
PIF_VALUE: 0
PIF_VALUE: 23
PIF_VALUE: 20
PIF_VALUE: 23
PIF_VALUE: 21
PIF_VALUE: 1
PIF_VALUE: 1
PIF_VALUE: 22
PIF_VALUE: 21
PIF_VALUE: 22
PIF_VALUE: 21
PIF_VALUE: 23
PIF_VALUE: 2
PIF_VALUE: 21
PIF_VALUE: 23
PIF_VALUE: 20
PIF_VALUE: 21
PIF_VALUE: 12
PIF_VALUE: 22
PIF_VALUE: 25
PIF_VALUE: 21
PIF_VALUE: 20
PIF_VALUE: 21
PIF_VALUE: 22
PIF_VALUE: 20
PIF_VALUE: 27
PIF_VALUE: 21
PIF_VALUE: 5
PIF_VALUE: 0
PIF_VALUE: 17
PIF_VALUE: 20
PIF_VALUE: 22
PIF_VALUE: 21
PIF_VALUE: 20
PIF_VALUE: 24
PIF_VALUE: 33
PIF_VALUE: 20
PIF_VALUE: 0
PIF_VALUE: 20
PIF_VALUE: 41
PIF_VALUE: 21
PIF_VALUE: 4
PIF_VALUE: 19
PIF_VALUE: 20
PIF_VALUE: 21
PIF_VALUE: 4
PIF_VALUE: 21
PIF_VALUE: 21
PIF_VALUE: 22
PIF_VALUE: 20
PIF_VALUE: 21
PIF_VALUE: 21
PIF_VALUE: 13
PIF_VALUE: 21
PIF_VALUE: 22
PIF_VALUE: 21
PIF_VALUE: 21
PIF_VALUE: 22
PIF_VALUE: 22
PIF_VALUE: 20
PIF_VALUE: 20
PIF_VALUE: 21
PIF_VALUE: 19
PIF_VALUE: 21
PIF_VALUE: 23
PIF_VALUE: 19
PIF_VALUE: 22
PIF_VALUE: 19
PIF_VALUE: 35
PIF_VALUE: 21
PIF_VALUE: 20
PIF_VALUE: 20
PIF_VALUE: 21
PIF_VALUE: 19
PIF_VALUE: 21
PIF_VALUE: 26
PIF_VALUE: 3
PIF_VALUE: 22
PIF_VALUE: 19
PIF_VALUE: 20
PIF_VALUE: 20
PIF_VALUE: 3
PIF_VALUE: 20
PIF_VALUE: 21
PIF_VALUE: 20
PIF_VALUE: 21
PIF_VALUE: 20
PIF_VALUE: 26
PIF_VALUE: 24
PIF_VALUE: 5
PIF_VALUE: 21
PIF_VALUE: 4
PIF_VALUE: 20
PIF_VALUE: 3

## 2020-03-23 ASSESSMENT — PAIN SCALES - WONG BAKER
WONGBAKER_NUMERICALRESPONSE: 0

## 2020-03-23 ASSESSMENT — PAIN SCALES - GENERAL
PAINLEVEL_OUTOF10: 0
PAINLEVEL_OUTOF10: 3
PAINLEVEL_OUTOF10: 0
PAINLEVEL_OUTOF10: 3
PAINLEVEL_OUTOF10: 0
PAINLEVEL_OUTOF10: 3
PAINLEVEL_OUTOF10: 0
PAINLEVEL_OUTOF10: 9
PAINLEVEL_OUTOF10: 0
PAINLEVEL_OUTOF10: 0
PAINLEVEL_OUTOF10: 3

## 2020-03-23 ASSESSMENT — PAIN DESCRIPTION - PAIN TYPE: TYPE: SURGICAL PAIN

## 2020-03-23 ASSESSMENT — PAIN DESCRIPTION - PROGRESSION: CLINICAL_PROGRESSION: NOT CHANGED

## 2020-03-23 ASSESSMENT — PAIN DESCRIPTION - ORIENTATION: ORIENTATION: RIGHT

## 2020-03-23 ASSESSMENT — PAIN - FUNCTIONAL ASSESSMENT: PAIN_FUNCTIONAL_ASSESSMENT: 0-10

## 2020-03-23 ASSESSMENT — ENCOUNTER SYMPTOMS: SHORTNESS OF BREATH: 0

## 2020-03-23 ASSESSMENT — PAIN DESCRIPTION - FREQUENCY: FREQUENCY: CONTINUOUS

## 2020-03-23 ASSESSMENT — PAIN DESCRIPTION - LOCATION: LOCATION: SHOULDER

## 2020-03-23 ASSESSMENT — PAIN DESCRIPTION - ONSET: ONSET: ON-GOING

## 2020-03-23 ASSESSMENT — PAIN DESCRIPTION - DESCRIPTORS: DESCRIPTORS: ACHING

## 2020-03-23 NOTE — OP NOTE
taken back to the OR and transferred onto the operative table. The anesthesia team administered a general anesthetic and established/secured her airway . SCDs/TEDs were placed on both legs. The patient was carefully positioned and secured in the beach-chair position, padding all prominences. The right upper extremity was prepped and draped in the standard sterile fashion. The patient finished a course of pre-operative antibiotics by way of 2 G IV ancef. A time out was performed during which verification of surgical site, patient identification, and surgical procedure planned were confirmed by the circulating nurse and anesthetic team.    A 12 cm deltopectoral incision was made. The cephalic vein was identified and protected medially. The subdeltoid, subcoracoid and subacromial spaces were developed and released. The axillary nerve was identified and tug test normal. The biceps tendon was identified in the groove and was noted to be torn and scarred down. The fracture site was explored. The greater and lesser tuberosity fragments were identified. The fracture plane between them was developed and the valgus impacted humeral head identified. It was excised. There was no soft tissue attachment to the head or medial calcar attachment to the head. With the humeral head excised, I placed 3 #5 ethibond sutures around the greater tuberosity fragment for later tuberosity to tuberosity repair. 2 #2 fiber wire sutures were passed around the greater tuberosity fragment for tuberosity to stem repair, and a fiber tape suture was passed around the fragment for cerclage repair. I released around the upper border of the subscapularis to the base of the coracoid. A 360 degree joy-glenoid release was then performed resecting the labrum and releasing the capsule. The position for the centering hole was identified along the inferior half of the glenoid using the anatomic center line and drilled using a 2.5mm drill bit.   The

## 2020-03-23 NOTE — PROGRESS NOTES
Orthopedic Progress Note    Patient:  Ridge Pastrana  YOB: 1950     81 y.o. female    Subjective:  Patient seen and examined. No new issues or concerns at this time per patient or per nursing. NPO now. She is ready for surgery, all questions answered. She does have pain but currently controlled and comfortable. Reports minimal numbness, tingling to all finger tips. Continued weakness with right wrist/finger extension. Denies fever, HA, CP, SOB, N/V, dysuria  Vitals reviewed, afebrile, hypertensive    Objective:   Vitals:    03/22/20 2031   BP: (!) 170/57   Pulse: 56   Resp: 18   Temp: 99.1 °F (37.3 °C)   SpO2: 95%       Gen: NAD, cooperative   Cardiovascular: Regular rate, no dependent edema, distal pulses 2+  Respiratory: Chest symmetric, no accessory muscle use, normal respirations    RUE: Sling in place. Swelling about shoulder with TTP. Compartments soft. 2+ rad pulse. Weak with wrist extension 3/5, finger extension 2/5, thumb extension 2/5. Median nerves 4/5. Ulnar nerve 3/5. Able to make fist and . Able to actively extend at elbow. Axillary/MSC/Median/Radial/Ulnar nerve SILT with dysesthesias to all finger tips. Fingers warm and perfused.     Recent Labs     03/21/20  1733 03/22/20  0611 03/22/20  0624 03/22/20  1856   WBC 14.8*  --  11.2  --    HGB 12.6  --  11.5*  --    HCT 38.3  --  36.4  --      --  See Reflexed IPF Result  --    INR 1.0  --   --   --    * 134*  --   --    K 4.7 5.4*  --  4.8   BUN 16 27*  --   --    CREATININE 0.94* 1.29*  --   --    GLUCOSE 240* 194*  --   --         Meds: ASA - held  See rec for complete list    Impression/plan: 81 y.o. female being seen for right proximal humerus fracture-dislocation     -Plan for OR today Monday 3/23 for reverse TSA  -Medical optimization and surgical risk stratification from primary documented  -Per IM team patient is moderate risk for surgery and ok to proceed with surgery  -NPO now, abx on call to OR, consent in chart, operative extremity marked  -Weight bearing: NWKAELA TORRES  -Maintain sling, ok to work on right elbow, wrist, finger ROM  -Of note, right wrist extension, finger weakness. Will continue to monitor.   -IM primary  -Pain control per primary  -Strict ice for pain/swelling  -DVT ppx: EPC, hold chemical AC for today for surgery  -Please page Ortho with any questions or concerns    ----------------------------------------  Coral Díaz,   PGY-2, Department of [address], [place], [place]

## 2020-03-23 NOTE — PROGRESS NOTES
control  Some mild elevation in troponin will titrate  X-ray with no congestion  Given the slight elevation in troponin and BNP will obtain echocardiogram    Review of Systems:     Constitutional:  negative for chills, fevers, sweats  Respiratory:  negative for cough, dyspnea on exertion, shortness of breath, wheezing  Cardiovascular:  negative for chest pain, chest pressure/discomfort, lower extremity edema, palpitations  Gastrointestinal:  negative for abdominal pain, constipation, diarrhea, nausea, vomiting  Neurological:  negative for dizziness, headache    Medications: Allergies:  No Known Allergies    Current Meds:   Scheduled Meds:    [MAR Hold] amLODIPine  5 mg Oral Daily    [MAR Hold] etomidate  30 mg Intravenous Once    [MAR Hold] fentanNYL  100 mcg Intravenous Once    [Held by provider] aspirin  81 mg Oral Daily    [MAR Hold] fenofibrate  160 mg Oral Daily    [MAR Hold] metFORMIN  500 mg Oral BID WC    [MAR Hold] atorvastatin  20 mg Oral Daily    [MAR Hold] insulin lispro  0-6 Units Subcutaneous TID WC    [MAR Hold] insulin lispro  0-3 Units Subcutaneous Nightly     Continuous Infusions:    lactated ringers 100 mL/hr at 03/23/20 0908    sodium chloride      [MAR Hold] dextrose       PRN Meds: HYDROmorphone, HYDROmorphone, promethazine, sodium chloride, [MAR Hold] glucose, [MAR Hold] dextrose, [MAR Hold] glucagon (rDNA), [MAR Hold] dextrose, [MAR Hold] oxyCODONE-acetaminophen **OR** [MAR Hold] oxyCODONE-acetaminophen    Data:     Past Medical History:   has a past medical history of Bundle branch block, right, Degenerative arthritis of lumbar spine, Diabetes mellitus ([address] Diverticulosis of intestine, Hyperlipidemia, Hypertension, Scoliosis of lumbosacral spine, and Snores. Social History:   reports that she has never smoked. She has never used smokeless tobacco. She reports previous alcohol use. She reports that she does not use drugs.      Family History:   Family History   Problem Relation Age of Onset    Heart Failure Mother     Diabetes Mother     Heart Failure Father     Diabetes Father     Heart Failure Brother        Vitals:  BP (!) 156/65   Pulse 72   Temp 98.2 °F (36.8 °C) (Temporal)   Resp 16   Ht 5' 2\" (1.575 m)   Wt 210 lb (95.3 kg)   SpO2 96%   BMI 38.41 kg/m²   Temp (24hrs), Av.9 °F (37.2 °C), Min:94.2 °F (34.6 °C), Max:100.4 °F (38 °C)    Recent Labs     20  1132 20  1545 20  2034 20  0627   POCGLU 194* 184* 231* 141*       I/O (24Hr): Intake/Output Summary (Last 24 hours) at 3/23/2020 1355  Last data filed at 3/23/2020 1348  Gross per 24 hour   Intake --   Output 630 ml   Net -630 ml       Labs:  Hematology:  Recent Labs     20  1733 03/22/20  0624   WBC 14.8* 11.2   RBC 4.14 3.72*   HGB 12.6 11.5*   HCT 38.3 36.4   MCV 92.5 97.8   MCH 30.4 30.9   MCHC 32.9 31.6   RDW 12.8 12.9    See Reflexed IPF Result   MPV 10.9 NOT REPORTED   INR 1.0  --      Chemistry:  Recent Labs     20  1733  03/22/20  0611 20  1152 20  1723 20  1856 20  2351   *  --  134*  --   --   --   --    K 4.7  --  5.4*  --   --  4.8  --      --  102  --   --   --   --    CO2 16*  --  16*  --   --   --   --    GLUCOSE 240*  --  194*  --   --   --   --    BUN 16  --  27*  --   --   --   --    CREATININE 0.94*  --  1.29*  --   --   --   --    ANIONGAP 16  --  16  --   --   --   --    LABGLOM 59*  --  41*  --   --   --   --    GFRAA >60  --  50*  --   --   --   --    CALCIUM 9.4  --  8.9  --   --   --   --    PROBNP  --   --  1,242*  --   --   --   --    TROPHS  --    < > 16* 16* 15*  --  13   MYOGLOBIN  --   --   --  173* 143*  --  94*    < > = values in this interval not displayed.      Recent Labs     20  1733  20  0507 20  0611 20  0614 20  1132 20  1545 20  2034 20  0627   PROT  --   --   --  7.2  --   --   --   --   --    LABALBU  --   --   --  3.6  --   --   --   -- humeral head. Xr Humerus Right (min 2 Views)    Result Date: 3/21/2020  Anterior subcoracoid humeral head dislocation. Prominent fracture fragment projecting near the inferior glenoid suspicious for a Hill-Sachs defect with possible involvement of the greater tuberosity. Ct Shoulder Right Wo Contrast    Result Date: 3/21/2020  Comminuted impacted proximal humerus fracture centered at the humeral head-neck junction. There is a dominant displaced fracture fragment superiorly likely representing the greater tuberosity. The humeral head articular surface is rotated approximately 90 degrees anteriorly and superiorly with the majority of articular surface not articulating with the glenoid. Subtle concavity of the inferior glenoid could represent an impaction fracture. No displaced glenoid fracture is seen. Fat stranding in the right axilla compatible with a hematoma. Xr Chest Portable    Result Date: 3/21/2020  No acute process. Ct 3d Reconstruction    Result Date: 3/21/2020  Comminuted impacted proximal humerus fracture centered at the humeral head-neck junction. There is a dominant displaced fracture fragment superiorly likely representing the greater tuberosity. The humeral head articular surface is rotated approximately 90 degrees anteriorly and superiorly with the majority of articular surface not articulating with the glenoid. Subtle concavity of the inferior glenoid could represent an impaction fracture. No displaced glenoid fracture is seen. Fat stranding in the right axilla compatible with a hematoma.        Physical Examination:        General appearance:  alert, cooperative and no distress  Mental Status:  oriented to person, place and time and normal affect  Lungs:  clear to auscultation bilaterally, normal effort  Heart:  regular rate and rhythm, no murmur  Abdomen:  soft, nontender, nondistended, normal bowel sounds, no masses, hepatomegaly, splenomegaly  Extremities:  RUE in sling , TTP ,  no edema, redness, tenderness in the calves  Skin:  no gross lesions, rashes, induration    Assessment:        Hospital Problems           Last Modified POA    * (Principal) Shoulder fracture, right, closed, initial encounter 3/22/2020 Yes    Diabetes mellitus ([facility] [address] 3/21/2020 Yes    Mixed hyperlipidemia 3/21/2020 Yes    Closed dislocation of right shoulder 3/21/2020 Yes    Essential hypertension 3/21/2020 Yes          Plan:           Comminuted R proximal humerus fracture:  To OR today for right total shoulder arthroplasty    HTN: Slightly uncontrolled likely secondary to pain , continue home medications and continue to monitor    HPL: continue home medications    DM2: Continue diabetes home medications , insulin sliding scale, hypoglycemia protocol    DVT prophylaxis : resume once ok with ortho    Discussed with the patient and the nurse    Obtain BMP post sx      Alton Mares MD  3/23/2020  1:55 PM

## 2020-03-23 NOTE — BRIEF OP NOTE
Brief Postoperative Note  ______________________________________________________________    Patient: Julio Cesar Latham  YOB: 1950  MRN: [id number]  Date of Procedure: 3/23/2020    Pre-Op Diagnosis: PROXIMAL HUMERUS FRACTURE / DISLOCATION - RIGHT    Post-Op Diagnosis: Same       Procedure(s):   REVERSE TOTAL SHOULDER ARTHROPLASTY -    Anesthesia: Regional, General    Surgeon(s):  Orville Nieves MD    Assistant: Luca Howard DO (PGY 2); Justo Gann DO (PGY 2)    Estimated Blood Loss (mL): 717 mL    Complications: None    Specimens:   * No specimens in log *    Implants:  Implant Name Type Inv.  Item Serial No.  Lot No. LRB No. Used   [facility] SURGICAL GLENOID BASE PLATE REF [id number]     [id number] Right 1   DJO SURGICAL GLENOID HEAD WITH RETAINING SCREW -     [id number] Right 1   DJO SURGICAL BONE SCREW -     [id number] Right 1   DJO SURGICAL BONE SCREW -     [id number] Right 1   DJO SURGICAL BONE SCREW -     [id number] Right 1   DJO SURGICAL BONE SCREW -     [id number] Right 1   IMPL CEMENT PALACOS R+G [employer] IMPL CEMENT PALACOS R+G  [facility] [id number] Right 1   IMPL CEMENT PALACOS R+G [employer] IMPL CEMENT PALACOS R+G  [facility] [id number] Right 1   DJO CLEAR CUT CEMENT RESTRICTOR -     [id number] Right 1   DJO SURGICAL REVERSE HUMERAL STEM -     [id number] Right 1   BJO HUMERAL SOCKET INSERT =     [id number] Right 1         Drains:   Urethral Catheter 16 fr (Active)       Findings: See operative report    Orville Nieves MD  Date: 3/23/2020  Time: 1:52 PM

## 2020-03-23 NOTE — ANESTHESIA PROCEDURE NOTES
pathological findings on the image that were readily visible and related to the nerve being blocked. (5) A permanent ultrasound image was saved in the patient's record.         Medications Administered  Bupivacaine (MARCAINE) PF injection 0.5%, 30 mL  Reason for block: post-op pain management and at surgeon's request

## 2020-03-23 NOTE — ANESTHESIA PRE PROCEDURE
ROSA ISELA Garcia CNP        Or    [MAR Hold] oxyCODONE-acetaminophen (PERCOCET) 5-325 MG per tablet 2 tablet  2 tablet Oral Q4H PRN ROSA ISELA Edwards CNP   2 tablet at 20 0434       Allergies:  No Known Allergies    Problem List:    Patient Active Problem List   Diagnosis Code    Scoliosis of lumbosacral spine M41.9    Diabetes mellitus ([facility] [address] E11.9    Bundle branch block, right I45.10    Mixed hyperlipidemia E78.2    Closed dislocation of right shoulder S43.004A    Essential hypertension I10    Shoulder fracture, right, closed, initial encounter S42. 91XA       Past Medical History:        Diagnosis Date    Bundle branch block, right 2009    Degenerative arthritis of lumbar spine     Diabetes mellitus (HCC)     Diverticulosis of intestine     Hyperlipidemia     Hypertension     Scoliosis of lumbosacral spine     Snores        Past Surgical History:        Procedure Laterality Date    BACK SURGERY  13    l4-5/l5-s1 laminotomies and formanotomies    CARDIAC CATHETERIZATION      coronary artery block-- minimal     SECTION, CLASSIC      2 times    CHOLECYSTECTOMY  1984    HYSTERECTOMY      KNEE ARTHROPLASTY Right 2012    pt denies , states was meniscus repair only       Social History:    Social History     Tobacco Use    Smoking status: Never Smoker    Smokeless tobacco: Never Used   Substance Use Topics    Alcohol use: Not Currently                                Counseling given: Not Answered      Vital Signs (Current):   Vitals:    20 2031 20 0628 20 0830 20 0851   BP: (!) 170/57 (!) 158/64 (!) 172/89 (!) 157/56   Pulse: 56 67 79 70   Resp: 18  15 14   Temp: 99.1 °F (37.3 °C)  99 °F (37.2 °C) 98.2 °F (36.8 °C)   TempSrc: Oral  Temporal Temporal   SpO2: 95%  96% 93%   Weight:       Height:                                                  BP Readings from Last 3 Encounters:   20 (!) 157/56   09/10/13 118/78       NPO Status: Time of last liquid consumption: 2000                        Time of last solid consumption: 1630                        Date of last liquid consumption: 03/22/20                        Date of last solid food consumption: 03/22/20    BMI:   Wt Readings from Last 3 Encounters:   03/21/20 210 lb (95.3 kg)   09/10/13 199 lb 8.3 oz (90.5 kg)     Body mass index is 38.41 kg/m². CBC:   Lab Results   Component Value Date    WBC 11.2 03/22/2020    RBC 3.72 03/22/2020    HGB 11.5 03/22/2020    HCT 36.4 03/22/2020    MCV 97.8 03/22/2020    RDW 12.9 03/22/2020    PLT See Reflexed IPF Result 03/22/2020       CMP:   Lab Results   Component Value Date     03/22/2020    K 4.8 03/22/2020     03/22/2020    CO2 16 03/22/2020    BUN 27 03/22/2020    CREATININE 1.29 03/22/2020    GFRAA 50 03/22/2020    LABGLOM 41 03/22/2020    GLUCOSE 194 03/22/2020    PROT 7.2 03/22/2020    CALCIUM 8.9 03/22/2020    BILITOT 0.34 03/22/2020    ALKPHOS 52 03/22/2020    AST 27 03/22/2020    ALT 28 03/22/2020       POC Tests:   Recent Labs     03/23/20  1503   POCGLU 141*       Coags:   Lab Results   Component Value Date    PROTIME 10.4 03/21/2020    INR 1.0 03/21/2020    APTT 18.6 03/21/2020       HCG (If Applicable): No results found for: PREGTESTUR, PREGSERUM, HCG, HCGQUANT     ABGs: No results found for: PHART, PO2ART, QJD8COH, HEQ2YDC, BEART, O8IMYDDJ     Type & Screen (If Applicable):  No results found for: [facility]    Anesthesia Evaluation  Patient summary reviewed and Nursing notes reviewed no history of anesthetic complications:   Airway: Mallampati: II  TM distance: >3 FB   Neck ROM: full  Mouth opening: > = 3 FB Dental: normal exam         Pulmonary:normal exam  breath sounds clear to auscultation      (-) COPD, asthma, shortness of breath, recent URI and sleep apnea                          ROS comment: Denies sleep apnea but snores loudly and suspects she may have it.  Never been tested   Cardiovascular:Negative CV

## 2020-03-23 NOTE — PLAN OF CARE
Problem: Pain:  Goal: Pain level will decrease  Description: Pain level will decrease  3/23/2020 0301 by Bennett Lauren RN  Outcome: Ongoing     Problem: Pain:  Goal: Control of acute pain  Description: Control of acute pain  3/23/2020 0301 by Bennett Lauren RN  Outcome: Ongoing     Problem: Falls - Risk of:  Goal: Will remain free from falls  Description: Will remain free from falls  3/23/2020 0301 by Bennett Lauren RN  Outcome: Ongoing

## 2020-03-24 PROBLEM — N17.9 AKI (ACUTE KIDNEY INJURY) (HCC): Status: ACTIVE | Noted: 2020-03-24

## 2020-03-24 PROBLEM — E87.20 METABOLIC ACIDOSIS: Status: ACTIVE | Noted: 2020-03-24

## 2020-03-24 LAB
-: ABNORMAL
-: NORMAL
AMORPHOUS: ABNORMAL
ANION GAP SERPL CALCULATED.3IONS-SCNC: 12 MMOL/L (ref 9–17)
ANION GAP SERPL CALCULATED.3IONS-SCNC: 12 MMOL/L (ref 9–17)
ANION GAP SERPL CALCULATED.3IONS-SCNC: 13 MMOL/L (ref 9–17)
ANION GAP SERPL CALCULATED.3IONS-SCNC: 13 MMOL/L (ref 9–17)
ANION GAP SERPL CALCULATED.3IONS-SCNC: 14 MMOL/L (ref 9–17)
ANION GAP SERPL CALCULATED.3IONS-SCNC: 15 MMOL/L (ref 9–17)
BACTERIA: ABNORMAL
BILIRUBIN URINE: NEGATIVE
BUN BLDV-MCNC: 31 MG/DL (ref 8–23)
BUN BLDV-MCNC: 32 MG/DL (ref 8–23)
BUN BLDV-MCNC: 34 MG/DL (ref 8–23)
BUN BLDV-MCNC: 35 MG/DL (ref 8–23)
BUN/CREAT BLD: ABNORMAL (ref 9–20)
CALCIUM SERPL-MCNC: 8 MG/DL (ref 8.6–10.4)
CALCIUM SERPL-MCNC: 8 MG/DL (ref 8.6–10.4)
CALCIUM SERPL-MCNC: 8.2 MG/DL (ref 8.6–10.4)
CALCIUM SERPL-MCNC: 8.2 MG/DL (ref 8.6–10.4)
CALCIUM SERPL-MCNC: 8.3 MG/DL (ref 8.6–10.4)
CALCIUM SERPL-MCNC: 9 MG/DL (ref 8.6–10.4)
CASTS UA: ABNORMAL /LPF (ref 0–2)
CHLORIDE BLD-SCNC: 102 MMOL/L (ref 98–107)
CHLORIDE BLD-SCNC: 103 MMOL/L (ref 98–107)
CHLORIDE BLD-SCNC: 103 MMOL/L (ref 98–107)
CHLORIDE BLD-SCNC: 104 MMOL/L (ref 98–107)
CO2: 16 MMOL/L (ref 20–31)
CO2: 18 MMOL/L (ref 20–31)
CO2: 19 MMOL/L (ref 20–31)
COLOR: YELLOW
COMMENT UA: ABNORMAL
CREAT SERPL-MCNC: 1.21 MG/DL (ref 0.5–0.9)
CREAT SERPL-MCNC: 1.23 MG/DL (ref 0.5–0.9)
CREAT SERPL-MCNC: 1.43 MG/DL (ref 0.5–0.9)
CREAT SERPL-MCNC: 1.47 MG/DL (ref 0.5–0.9)
CREAT SERPL-MCNC: 1.48 MG/DL (ref 0.5–0.9)
CREAT SERPL-MCNC: 1.52 MG/DL (ref 0.5–0.9)
CRYSTALS, UA: ABNORMAL /HPF
EPITHELIAL CELLS UA: ABNORMAL /HPF (ref 0–5)
ESTIMATED AVERAGE GLUCOSE: 206 MG/DL
GFR AFRICAN AMERICAN: 41 ML/MIN
GFR AFRICAN AMERICAN: 43 ML/MIN
GFR AFRICAN AMERICAN: 43 ML/MIN
GFR AFRICAN AMERICAN: 44 ML/MIN
GFR AFRICAN AMERICAN: 53 ML/MIN
GFR AFRICAN AMERICAN: 54 ML/MIN
GFR NON-AFRICAN AMERICAN: 34 ML/MIN
GFR NON-AFRICAN AMERICAN: 35 ML/MIN
GFR NON-AFRICAN AMERICAN: 35 ML/MIN
GFR NON-AFRICAN AMERICAN: 37 ML/MIN
GFR NON-AFRICAN AMERICAN: 44 ML/MIN
GFR NON-AFRICAN AMERICAN: 44 ML/MIN
GFR SERPL CREATININE-BSD FRML MDRD: ABNORMAL ML/MIN/{1.73_M2}
GLUCOSE BLD-MCNC: 130 MG/DL (ref 65–105)
GLUCOSE BLD-MCNC: 131 MG/DL (ref 70–99)
GLUCOSE BLD-MCNC: 136 MG/DL (ref 65–105)
GLUCOSE BLD-MCNC: 146 MG/DL (ref 65–105)
GLUCOSE BLD-MCNC: 152 MG/DL (ref 65–105)
GLUCOSE BLD-MCNC: 157 MG/DL (ref 65–105)
GLUCOSE BLD-MCNC: 157 MG/DL (ref 70–99)
GLUCOSE BLD-MCNC: 158 MG/DL (ref 65–105)
GLUCOSE BLD-MCNC: 176 MG/DL (ref 65–105)
GLUCOSE BLD-MCNC: 179 MG/DL (ref 65–105)
GLUCOSE BLD-MCNC: 196 MG/DL (ref 70–99)
GLUCOSE BLD-MCNC: 197 MG/DL (ref 70–99)
GLUCOSE BLD-MCNC: 228 MG/DL (ref 65–105)
GLUCOSE BLD-MCNC: 242 MG/DL (ref 65–105)
GLUCOSE BLD-MCNC: 274 MG/DL (ref 70–99)
GLUCOSE BLD-MCNC: 303 MG/DL (ref 70–99)
GLUCOSE URINE: ABNORMAL
HBA1C MFR BLD: 8.8 % (ref 4–6)
KETONES, URINE: NEGATIVE
LEUKOCYTE ESTERASE, URINE: ABNORMAL
MAGNESIUM: 1.7 MG/DL (ref 1.6–2.6)
MAGNESIUM: 1.8 MG/DL (ref 1.6–2.6)
MAGNESIUM: 2 MG/DL (ref 1.6–2.6)
MUCUS: ABNORMAL
NITRITE, URINE: NEGATIVE
OTHER OBSERVATIONS UA: ABNORMAL
PH UA: 5 (ref 5–8)
PHOSPHORUS: 3.4 MG/DL (ref 2.6–4.5)
PHOSPHORUS: 3.5 MG/DL (ref 2.6–4.5)
PHOSPHORUS: 3.6 MG/DL (ref 2.6–4.5)
PHOSPHORUS: 3.7 MG/DL (ref 2.6–4.5)
PHOSPHORUS: 4.2 MG/DL (ref 2.6–4.5)
PHOSPHORUS: 4.2 MG/DL (ref 2.6–4.5)
POTASSIUM SERPL-SCNC: 4.4 MMOL/L (ref 3.7–5.3)
POTASSIUM SERPL-SCNC: 5 MMOL/L (ref 3.7–5.3)
POTASSIUM SERPL-SCNC: 5.2 MMOL/L (ref 3.7–5.3)
POTASSIUM SERPL-SCNC: 5.4 MMOL/L (ref 3.7–5.3)
PROTEIN UA: ABNORMAL
RBC UA: ABNORMAL /HPF (ref 0–2)
REASON FOR REJECTION: NORMAL
RENAL EPITHELIAL, UA: ABNORMAL /HPF
SODIUM BLD-SCNC: 131 MMOL/L (ref 135–144)
SODIUM BLD-SCNC: 133 MMOL/L (ref 135–144)
SODIUM BLD-SCNC: 134 MMOL/L (ref 135–144)
SODIUM BLD-SCNC: 136 MMOL/L (ref 135–144)
SPECIFIC GRAVITY UA: 1.01 (ref 1–1.03)
TRICHOMONAS: ABNORMAL
TURBIDITY: ABNORMAL
URINE HGB: ABNORMAL
UROBILINOGEN, URINE: NORMAL
WBC UA: ABNORMAL /HPF (ref 0–5)
YEAST: ABNORMAL
ZZ NTE CLEAN UP: ORDERED TEST: NORMAL
ZZ NTE WITH NAME CLEAN UP: SPECIMEN SOURCE: NORMAL

## 2020-03-24 PROCEDURE — 2500000003 HC RX 250 WO HCPCS: Performed by: INTERNAL MEDICINE

## 2020-03-24 PROCEDURE — 83735 ASSAY OF MAGNESIUM: CPT

## 2020-03-24 PROCEDURE — 97166 OT EVAL MOD COMPLEX 45 MIN: CPT

## 2020-03-24 PROCEDURE — 2580000003 HC RX 258: Performed by: INTERNAL MEDICINE

## 2020-03-24 PROCEDURE — 82947 ASSAY GLUCOSE BLOOD QUANT: CPT

## 2020-03-24 PROCEDURE — 51798 US URINE CAPACITY MEASURE: CPT

## 2020-03-24 PROCEDURE — 97162 PT EVAL MOD COMPLEX 30 MIN: CPT

## 2020-03-24 PROCEDURE — 36415 COLL VENOUS BLD VENIPUNCTURE: CPT

## 2020-03-24 PROCEDURE — 2060000000 HC ICU INTERMEDIATE R&B

## 2020-03-24 PROCEDURE — 2580000003 HC RX 258: Performed by: STUDENT IN AN ORGANIZED HEALTH CARE EDUCATION/TRAINING PROGRAM

## 2020-03-24 PROCEDURE — 84100 ASSAY OF PHOSPHORUS: CPT

## 2020-03-24 PROCEDURE — 97535 SELF CARE MNGMENT TRAINING: CPT

## 2020-03-24 PROCEDURE — 81001 URINALYSIS AUTO W/SCOPE: CPT

## 2020-03-24 PROCEDURE — 6360000002 HC RX W HCPCS: Performed by: STUDENT IN AN ORGANIZED HEALTH CARE EDUCATION/TRAINING PROGRAM

## 2020-03-24 PROCEDURE — 87086 URINE CULTURE/COLONY COUNT: CPT

## 2020-03-24 PROCEDURE — 51701 INSERT BLADDER CATHETER: CPT

## 2020-03-24 PROCEDURE — 83036 HEMOGLOBIN GLYCOSYLATED A1C: CPT

## 2020-03-24 PROCEDURE — 6360000002 HC RX W HCPCS: Performed by: FAMILY MEDICINE

## 2020-03-24 PROCEDURE — 6370000000 HC RX 637 (ALT 250 FOR IP): Performed by: NURSE PRACTITIONER

## 2020-03-24 PROCEDURE — 99233 SBSQ HOSP IP/OBS HIGH 50: CPT | Performed by: INTERNAL MEDICINE

## 2020-03-24 PROCEDURE — 6370000000 HC RX 637 (ALT 250 FOR IP): Performed by: INTERNAL MEDICINE

## 2020-03-24 PROCEDURE — 6370000000 HC RX 637 (ALT 250 FOR IP): Performed by: STUDENT IN AN ORGANIZED HEALTH CARE EDUCATION/TRAINING PROGRAM

## 2020-03-24 PROCEDURE — 80048 BASIC METABOLIC PNL TOTAL CA: CPT

## 2020-03-24 PROCEDURE — 97530 THERAPEUTIC ACTIVITIES: CPT

## 2020-03-24 PROCEDURE — 97110 THERAPEUTIC EXERCISES: CPT

## 2020-03-24 RX ORDER — CEFTRIAXONE 1 G/1
1 INJECTION, POWDER, FOR SOLUTION INTRAMUSCULAR; INTRAVENOUS ONCE
Status: DISCONTINUED | OUTPATIENT
Start: 2020-03-24 | End: 2020-03-24

## 2020-03-24 RX ORDER — OXYBUTYNIN CHLORIDE 5 MG/1
5 TABLET ORAL 3 TIMES DAILY
Status: DISCONTINUED | OUTPATIENT
Start: 2020-03-24 | End: 2020-03-26 | Stop reason: HOSPADM

## 2020-03-24 RX ORDER — CEFTRIAXONE 1 G/1
1 INJECTION, POWDER, FOR SOLUTION INTRAMUSCULAR; INTRAVENOUS ONCE
Status: DISCONTINUED | OUTPATIENT
Start: 2020-03-24 | End: 2020-03-26 | Stop reason: HOSPADM

## 2020-03-24 RX ORDER — DEXTROSE MONOHYDRATE 50 MG/ML
100 INJECTION, SOLUTION INTRAVENOUS PRN
Status: DISCONTINUED | OUTPATIENT
Start: 2020-03-24 | End: 2020-03-26 | Stop reason: HOSPADM

## 2020-03-24 RX ORDER — DEXTROSE MONOHYDRATE 25 G/50ML
12.5 INJECTION, SOLUTION INTRAVENOUS PRN
Status: DISCONTINUED | OUTPATIENT
Start: 2020-03-24 | End: 2020-03-26 | Stop reason: HOSPADM

## 2020-03-24 RX ORDER — NICOTINE POLACRILEX 4 MG
15 LOZENGE BUCCAL PRN
Status: DISCONTINUED | OUTPATIENT
Start: 2020-03-24 | End: 2020-03-26 | Stop reason: HOSPADM

## 2020-03-24 RX ADMIN — AMLODIPINE BESYLATE 5 MG: 5 TABLET ORAL at 08:00

## 2020-03-24 RX ADMIN — OXYBUTYNIN CHLORIDE 5 MG: 5 TABLET ORAL at 23:13

## 2020-03-24 RX ADMIN — OXYCODONE HYDROCHLORIDE 10 MG: 5 TABLET ORAL at 09:05

## 2020-03-24 RX ADMIN — ACETAMINOPHEN 1000 MG: 500 TABLET ORAL at 04:51

## 2020-03-24 RX ADMIN — DEXTROSE MONOHYDRATE 2 G: 50 INJECTION, SOLUTION INTRAVENOUS at 08:00

## 2020-03-24 RX ADMIN — OXYCODONE HYDROCHLORIDE 10 MG: 5 TABLET ORAL at 23:13

## 2020-03-24 RX ADMIN — INSULIN LISPRO 2 UNITS: 100 INJECTION, SOLUTION INTRAVENOUS; SUBCUTANEOUS at 11:50

## 2020-03-24 RX ADMIN — ACETAMINOPHEN 1000 MG: 500 TABLET ORAL at 10:28

## 2020-03-24 RX ADMIN — ACETAMINOPHEN 1000 MG: 500 TABLET ORAL at 15:32

## 2020-03-24 RX ADMIN — INSULIN LISPRO 4 UNITS: 100 INJECTION, SOLUTION INTRAVENOUS; SUBCUTANEOUS at 16:59

## 2020-03-24 RX ADMIN — INSULIN LISPRO 2 UNITS: 100 INJECTION, SOLUTION INTRAVENOUS; SUBCUTANEOUS at 20:29

## 2020-03-24 RX ADMIN — OXYCODONE HYDROCHLORIDE 10 MG: 5 TABLET ORAL at 04:51

## 2020-03-24 RX ADMIN — FENOFIBRATE 160 MG: 160 TABLET ORAL at 08:00

## 2020-03-24 RX ADMIN — OXYCODONE HYDROCHLORIDE 10 MG: 5 TABLET ORAL at 15:32

## 2020-03-24 RX ADMIN — ATORVASTATIN CALCIUM 20 MG: 20 TABLET, FILM COATED ORAL at 08:00

## 2020-03-24 RX ADMIN — ACETAMINOPHEN 1000 MG: 500 TABLET ORAL at 21:20

## 2020-03-24 RX ADMIN — DEXTROSE MONOHYDRATE 2 G: 50 INJECTION, SOLUTION INTRAVENOUS at 00:30

## 2020-03-24 RX ADMIN — SODIUM CHLORIDE: 9 INJECTION, SOLUTION INTRAVENOUS at 08:50

## 2020-03-24 RX ADMIN — POTASSIUM CHLORIDE 10 MEQ: 7.46 INJECTION, SOLUTION INTRAVENOUS at 01:30

## 2020-03-24 RX ADMIN — METOPROLOL TARTRATE 25 MG: 25 TABLET ORAL at 21:20

## 2020-03-24 RX ADMIN — SODIUM BICARBONATE: 84 INJECTION, SOLUTION INTRAVENOUS at 16:59

## 2020-03-24 ASSESSMENT — PAIN SCALES - GENERAL
PAINLEVEL_OUTOF10: 10
PAINLEVEL_OUTOF10: 9
PAINLEVEL_OUTOF10: 10
PAINLEVEL_OUTOF10: 5
PAINLEVEL_OUTOF10: 8
PAINLEVEL_OUTOF10: 9
PAINLEVEL_OUTOF10: 10
PAINLEVEL_OUTOF10: 5
PAINLEVEL_OUTOF10: 5

## 2020-03-24 ASSESSMENT — PAIN DESCRIPTION - PAIN TYPE
TYPE: SURGICAL PAIN
TYPE: SURGICAL PAIN

## 2020-03-24 ASSESSMENT — PAIN DESCRIPTION - ORIENTATION
ORIENTATION: RIGHT
ORIENTATION: RIGHT

## 2020-03-24 ASSESSMENT — PAIN DESCRIPTION - LOCATION
LOCATION: SHOULDER

## 2020-03-24 ASSESSMENT — PAIN DESCRIPTION - DESCRIPTORS: DESCRIPTORS: ACHING;DISCOMFORT

## 2020-03-24 ASSESSMENT — PAIN DESCRIPTION - FREQUENCY: FREQUENCY: CONTINUOUS

## 2020-03-24 ASSESSMENT — PAIN DESCRIPTION - ONSET: ONSET: ON-GOING

## 2020-03-24 NOTE — PLAN OF CARE
Problem: Pain:  Goal: Control of acute pain  Description: Control of acute pain  Outcome: Ongoing   Pt reports no pain this shift. Nurse will continue to assess for remainder of shift. Problem: Serum Glucose Level - Abnormal:  Goal: Ability to maintain appropriate glucose levels will improve  Description: Ability to maintain appropriate glucose levels will improve  Outcome: Ongoing  Patient ability to maintain appropriate glucose levels improved throughout the shift. Q1hr blood sugar checks.      Electronically signed by Shabana Santiago RN on 3/24/2020 at 1:59 AM

## 2020-03-24 NOTE — PROGRESS NOTES
Physical Therapy    Facility/Department: [facility] 4B STEPDOWN  Initial Assessment    NAME: Julio Cesar Latham  : 1950  MRN: [id number]  S/p R REVERSE TOTAL SHOULDER ARTHROPLASTY -  Date of Service: 3/24/2020    Discharge Recommendations: Further therapy recommended at discharge. PT Equipment Recommendations  Equipment Needed: No    Assessment   Body structures, Functions, Activity limitations: Decreased functional mobility ; Decreased endurance;Decreased strength;Decreased balance; Increased pain;Decreased ROM  Assessment: The pt performed functional transfers and ambulation with CGA, limited by R shoulder. Performed AAROM of elbow/wrist/hand and pendulums 10x with fair tolerance. Recommend continued therapy to improve RUE mobility and strength. Prognosis: Good  Decision Making: Medium Complexity  PT Education: Goals;PT Role;Plan of Care;Transfer Training  REQUIRES PT FOLLOW UP: Yes  Activity Tolerance  Activity Tolerance: Patient limited by endurance; Patient limited by pain       Patient Diagnosis(es): The encounter diagnosis was Closed fracture dislocation of right shoulder, initial encounter. has a past medical history of Bundle branch block, right, Degenerative arthritis of lumbar spine, Diabetes mellitus ([facility] [address] Diverticulosis of intestine, Hyperlipidemia, Hypertension, Scoliosis of lumbosacral spine, and Snores. has a past surgical history that includes Hysterectomy;  section, classic; Knee Arthroplasty (Right, ); Cholecystectomy (); back surgery (13); Cardiac catheterization (); and Total shoulder arthroplasty (Right, 2020).     Restrictions  Restrictions/Precautions  Restrictions/Precautions: Fall Risk, Weight Bearing  Required Braces or Orthoses?: Yes  Upper Extremity Weight Bearing Restrictions  Right Upper Extremity Weight Bearing: Non Weight Bearing  Required Braces or Orthoses  Right Upper Extremity Brace/Splint: Sling  Position Activity Restriction  Other Secondary(spouse completes majority of meals)  Laundry Responsibility: Primary  Cleaning Responsibility: Secondary(shared with spouse)  Bill Paying/Finance Responsibility: Secondary(shared with spouse)  Shopping Responsibility: Secondary(shared with spouse)  Ambulation Assistance: Independent  Transfer Assistance: Independent  Active : Yes  Mode of Transportation: Car  Occupation: Retired  Leisure & Hobbies: Enjoys reading, computer, cross-stitch  Additional Comments: Pt reports  is also retired and able to assist prn.   Cognition   Cognition  Overall Cognitive Status: WFL    Objective          Joint Mobility  Spine: WFL  ROM RLE: WFL  ROM LLE: WFL  ROM RUE: AAROM elbow, wrist and hand WFL; shoulder not assessed d/t restrictions  ROM LUE: WFL  Strength RLE  Strength RLE: WFL  Strength LLE  Strength LLE: WFL  Strength RUE  Comment: Shoulder not assessed, R elbow/wrist/hand grossly 2-/5  Strength LUE  Strength LUE: WFL  Tone RLE  RLE Tone: Normotonic  Tone LLE  LLE Tone: Normotonic  Motor Control  Gross Motor?: WFL  Sensation  Overall Sensation Status: Impaired(Pt reports acute and constant numbness in R fingers)  Bed mobility  Scooting: Contact guard assistance  Comment: Pt sitting EOB upon arrival, left in restroom with OT following ambulation  Transfers  Sit to Stand: Contact guard assistance  Stand to sit: Contact guard assistance  Stand Pivot Transfers: Contact guard assistance  Ambulation  Ambulation?: Yes  Ambulation 1  Surface: level tile  Device: No Device  Assistance: Contact guard assistance  Quality of Gait: decreased flower, mildly unsteady, lateral sway  Distance: 20ft  Stairs/Curb  Stairs?: No     Balance  Posture: Good  Sitting - Static: Good  Sitting - Dynamic: Good;-  Standing - Static: Fair;+  Standing - Dynamic: Fair  Comments: standing balance assessed without AD        Plan   Plan  Times per week: 5-6x/wk  Current Treatment Recommendations: Strengthening, Balance Training, Functional Mobility Training, Transfer Training, Safety Education & Training, Home Exercise Program, Patient/Caregiver Education & Training, Gait Training, Endurance Training, ROM, Stair training  Safety Devices  Type of devices: Gait belt, Call light within reach, Nurse notified(left in restroom with OT upon exit)  Restraints  Initially in place: No      AM-PAC Score  AM-PAC Inpatient Mobility Raw Score : 18 (03/24/20 1213)  AM-PAC Inpatient T-Scale Score : 43.63 (03/24/20 1213)  Mobility Inpatient CMS 0-100% Score: 46.58 (03/24/20 1213)  Mobility Inpatient CMS G-Code Modifier : CK (03/24/20 1213)          Goals  Short term goals  Time Frame for Short term goals: 14 visits  Short term goal 1: Perform AROM R hand/wrist/elbow to improve strength and ROM  Short term goal 2: Mod I for bed mobility and functional transfers   Short term goal 3: Ambulate 300ft independently  Short term goal 4: Ascend/descend 2 steps without HR and CGA  Short term goal 5: Demo Good dynamic standing balance       Therapy Time   Individual Concurrent Group Co-treatment   Time In 1100         Time Out 1136         Minutes 36         Timed Code Treatment Minutes: 10 Minutes       Donaldo Brewster, PT

## 2020-03-24 NOTE — PROGRESS NOTES
Transportation: Car  Occupation: Retired  Leisure & Hobbies: Enjoys reading, computer, cross-stitch  Additional Comments: Pt reports  is also retired and able to assist prn. Objective   Vision: Impaired  Vision Exceptions: Wears glasses for reading;Wears glasses for distance(glasses present in hospitaL)  Hearing: Within functional limits    Orientation  Overall Orientation Status: Within Functional Limits     Balance  Sitting Balance: Supervision  Standing Balance: Contact guard assistance  Standing Balance  Time: ~3 min  Activity: hand and joy hygiene  Functional Mobility  Functional - Mobility Device: No device  Activity: To/from bathroom  Assist Level: Contact guard assistance  Toilet Transfers  Toilet - Technique: Ambulating  Equipment Used: Grab bars  Toilet Transfer: Contact guard assistance  ADL  Feeding: Setup;Minimal assistance(with use of L hand)  Grooming: Minimal assistance;Setup; Increased time to complete(with use of L hand)  UE Bathing: Moderate assistance;Setup; Increased time to complete  LE Bathing: Moderate assistance;Setup; Increased time to complete  UE Dressing: Moderate assistance;Setup; Increased time to complete  LE Dressing: Moderate assistance;Setup; Increased time to complete  Toileting: Contact guard assistance  Tone RUE  RUE Tone: Normotonic  Tone LUE  LUE Tone: Normotonic  Coordination  Movements Are Fluid And Coordinated: No  Coordination and Movement description: Gross motor impairments; Fine motor impairments;Right UE     Bed mobility  Sit to Supine: Minimal assistance(for BLE management)  Scooting: Contact guard assistance  Comment: Pt standing at eob upon arrival, just returned from restroom  Transfers  Stand Step Transfers: Contact guard assistance  Sit to stand: Contact guard assistance  Stand to sit: Stand by assistance     Cognition  Overall Cognitive Status: WFL     Sensation  Overall Sensation Status: Impaired(? (Pt reports acute and constant numbness in R

## 2020-03-24 NOTE — PROGRESS NOTES
Orthopedic Progress Note    Patient:  Eryn Ching  YOB: 1950     81 y.o. female    Subjective:  Patient seen and examined  No complaints or concerns, states her left shoulder is sore this AM  No issue overnight  Pain controlled on current regimen  Denies fever, HA, CP, SOB, N/V, dysuria    Vitals reviewed, afebrile    Objective:   Vitals:    03/24/20 0329   BP: 130/68   Pulse: 70   Resp: 17   Temp: 98 °F (36.7 °C)   SpO2: 95%     Gen: NAD, cooperative    RUE: surgical dressing in place, c/d/i w/o strikethrough bleeding or drainage. Ultrasling on. Patient is able to actively flex and extend the digits, hesitant due to discomfort. Radial/median/ulnar/AIN/PIN motor complex intact grossly. Superficial radial/median/ulnar/axillary sensory nerves SILT grossly. Radial pulse 2+. Recent Labs     03/21/20  1733  03/22/20  0624  03/24/20  0325   WBC 14.8*  --  11.2  --   --    HGB 12.6  --  11.5*  --   --    HCT 38.3  --  36.4  --   --      --  See Reflexed IPF Result  --   --    INR 1.0  --   --   --   --    *   < >  --    < > 131*   K 4.7   < >  --    < > 5.4*   BUN 16   < >  --    < > 34*   CREATININE 0.94*   < >  --    < > 1.47*   GLUCOSE 240*   < >  --    < > 157*    < > = values in this interval not displayed. Meds: Ancef   See rec for complete list    Impression/plan: 81 y.o. female s/p right reverse total shoulder arthroplasty for proximal humerus fracture, POD #1    -Complete post-op abx  -Reinforce dressing if needed to the right shoulder, will perform formal dressing take down and change tomorrow, 3/25/20 if patient in house  -Maintain sling at all times, ok to remove to to work on pendulums with PT and elbow, hand, and wrist ROM  -No lifting, pushing or pulling with the right arm  -Pain control PO/IV Medication. Attempt to Wean IV medications.  Cont in-patient pain regimen of 1000 mg PO Tylenol Q6H, Roxicodone 5-10 mg PO Q4H PRN, Toradol 30 mg IV x 3 doses  -Patient will be managed with [facility] outpatient, script in chart  -DVT ppx: EPC, no need for formal anticoagulation from orthopedic standpoint  -Ice/Elevate for pain and swelling relief  -Encourage Incentive Spirometry use  -PT/OT to eval and treat  -Ok for DC from orthopedic standpoint once ok with primary team. Patient to follow up with Dr. Ambar Schafer 10-14 days after surgery.  If patient remains in house on 3/25/20 will perform formal dressing change at that time  -Patient may be discharged without formal dressing change with outpatient follow up  -Please page DO ortho with any questions        Chen Zimmerman DO   Orthopedic Surgery Resident PGY-3  [facility]

## 2020-03-24 NOTE — PROGRESS NOTES
wheezing  Cardiovascular:  negative for chest pain, chest pressure/discomfort, lower extremity edema, palpitations  Gastrointestinal:  negative for abdominal pain, constipation, diarrhea, nausea, vomiting  Neurological:  negative for dizziness, headache    Medications: Allergies:  No Known Allergies    Current Meds:   Scheduled Meds:    insulin lispro  0-12 Units Subcutaneous TID WC    insulin lispro  0-6 Units Subcutaneous Nightly    acetaminophen  1,000 mg Oral Q6H    amLODIPine  5 mg Oral Daily    [Held by provider] aspirin  81 mg Oral Daily    fenofibrate  160 mg Oral Daily    atorvastatin  20 mg Oral Daily     Continuous Infusions:    dextrose      sodium bicarbonate infusion      dextrose 5 % and 0.45 % NaCl 75 mL/hr at 20 2240    dextrose       PRN Meds: glucose, dextrose, glucagon (rDNA), dextrose, oxyCODONE, oxyCODONE, dextrose, potassium chloride, magnesium sulfate, sodium phosphate IVPB **OR** sodium phosphate IVPB **OR** sodium phosphate IVPB, dextrose 5 % and 0.45 % NaCl, glucose, dextrose, glucagon (rDNA), dextrose    Data:     Past Medical History:   has a past medical history of Bundle branch block, right, Degenerative arthritis of lumbar spine, Diabetes mellitus ([address] Diverticulosis of intestine, Hyperlipidemia, Hypertension, Scoliosis of lumbosacral spine, and Snores. Social History:   reports that she has never smoked. She has never used smokeless tobacco. She reports previous alcohol use. She reports that she does not use drugs.      Family History:   Family History   Problem Relation Age of Onset    Heart Failure Mother     Diabetes Mother     Heart Failure Father     Diabetes Father     Heart Failure Brother        Vitals:  BP (!) 167/70   Pulse 79   Temp 98.6 °F (37 °C) (Oral)   Resp 13   Ht 5' 2\" (1.575 m)   Wt 210 lb (95.3 kg)   SpO2 94%   BMI 38.41 kg/m²   Temp (24hrs), Av.2 °F (36.8 °C), Min:97.3 °F (36.3 °C), Max:99 °F (37.2 °C)    Recent Labs

## 2020-03-25 PROBLEM — E87.5 HYPERKALEMIA, DIMINISHED RENAL EXCRETION: Status: ACTIVE | Noted: 2020-03-25

## 2020-03-25 PROBLEM — N30.00 ACUTE CYSTITIS WITHOUT HEMATURIA: Status: ACTIVE | Noted: 2020-03-25

## 2020-03-25 PROBLEM — R00.1 SINUS BRADYCARDIA: Status: ACTIVE | Noted: 2020-03-25

## 2020-03-25 LAB
ANION GAP SERPL CALCULATED.3IONS-SCNC: 11 MMOL/L (ref 9–17)
ANION GAP SERPL CALCULATED.3IONS-SCNC: 11 MMOL/L (ref 9–17)
ANION GAP SERPL CALCULATED.3IONS-SCNC: 12 MMOL/L (ref 9–17)
ANION GAP SERPL CALCULATED.3IONS-SCNC: 13 MMOL/L (ref 9–17)
ANION GAP SERPL CALCULATED.3IONS-SCNC: 14 MMOL/L (ref 9–17)
ANION GAP SERPL CALCULATED.3IONS-SCNC: 15 MMOL/L (ref 9–17)
BUN BLDV-MCNC: 27 MG/DL (ref 8–23)
BUN BLDV-MCNC: 27 MG/DL (ref 8–23)
BUN BLDV-MCNC: 28 MG/DL (ref 8–23)
BUN BLDV-MCNC: 29 MG/DL (ref 8–23)
BUN BLDV-MCNC: 30 MG/DL (ref 8–23)
BUN BLDV-MCNC: 31 MG/DL (ref 8–23)
BUN/CREAT BLD: ABNORMAL (ref 9–20)
CALCIUM SERPL-MCNC: 8.6 MG/DL (ref 8.6–10.4)
CALCIUM SERPL-MCNC: 9 MG/DL (ref 8.6–10.4)
CALCIUM SERPL-MCNC: 9.1 MG/DL (ref 8.6–10.4)
CHLORIDE BLD-SCNC: 100 MMOL/L (ref 98–107)
CHLORIDE BLD-SCNC: 101 MMOL/L (ref 98–107)
CHLORIDE BLD-SCNC: 104 MMOL/L (ref 98–107)
CHLORIDE BLD-SCNC: 99 MMOL/L (ref 98–107)
CO2: 17 MMOL/L (ref 20–31)
CO2: 19 MMOL/L (ref 20–31)
CO2: 20 MMOL/L (ref 20–31)
CO2: 21 MMOL/L (ref 20–31)
CREAT SERPL-MCNC: 0.92 MG/DL (ref 0.5–0.9)
CREAT SERPL-MCNC: 0.95 MG/DL (ref 0.5–0.9)
CREAT SERPL-MCNC: 0.96 MG/DL (ref 0.5–0.9)
CREAT SERPL-MCNC: 0.98 MG/DL (ref 0.5–0.9)
CREAT SERPL-MCNC: 1.04 MG/DL (ref 0.5–0.9)
CREAT SERPL-MCNC: 1.07 MG/DL (ref 0.5–0.9)
CULTURE: NORMAL
GFR AFRICAN AMERICAN: >60 ML/MIN
GFR NON-AFRICAN AMERICAN: 51 ML/MIN
GFR NON-AFRICAN AMERICAN: 53 ML/MIN
GFR NON-AFRICAN AMERICAN: 57 ML/MIN
GFR NON-AFRICAN AMERICAN: 58 ML/MIN
GFR NON-AFRICAN AMERICAN: 59 ML/MIN
GFR NON-AFRICAN AMERICAN: >60 ML/MIN
GFR SERPL CREATININE-BSD FRML MDRD: ABNORMAL ML/MIN/{1.73_M2}
GLUCOSE BLD-MCNC: 181 MG/DL (ref 65–105)
GLUCOSE BLD-MCNC: 201 MG/DL (ref 70–99)
GLUCOSE BLD-MCNC: 203 MG/DL (ref 65–105)
GLUCOSE BLD-MCNC: 214 MG/DL (ref 65–105)
GLUCOSE BLD-MCNC: 227 MG/DL (ref 70–99)
GLUCOSE BLD-MCNC: 240 MG/DL (ref 70–99)
GLUCOSE BLD-MCNC: 246 MG/DL (ref 70–99)
GLUCOSE BLD-MCNC: 247 MG/DL (ref 65–105)
GLUCOSE BLD-MCNC: 276 MG/DL (ref 70–99)
GLUCOSE BLD-MCNC: 318 MG/DL (ref 65–105)
GLUCOSE BLD-MCNC: 354 MG/DL (ref 70–99)
Lab: NORMAL
MAGNESIUM: 2 MG/DL (ref 1.6–2.6)
MAGNESIUM: 2.1 MG/DL (ref 1.6–2.6)
MAGNESIUM: 2.1 MG/DL (ref 1.6–2.6)
MAGNESIUM: 2.2 MG/DL (ref 1.6–2.6)
PHOSPHORUS: 2.5 MG/DL (ref 2.6–4.5)
PHOSPHORUS: 3 MG/DL (ref 2.6–4.5)
PHOSPHORUS: 3 MG/DL (ref 2.6–4.5)
PHOSPHORUS: 3.1 MG/DL (ref 2.6–4.5)
PHOSPHORUS: 3.5 MG/DL (ref 2.6–4.5)
PHOSPHORUS: 3.7 MG/DL (ref 2.6–4.5)
POTASSIUM SERPL-SCNC: 4.2 MMOL/L (ref 3.7–5.3)
POTASSIUM SERPL-SCNC: 4.3 MMOL/L (ref 3.7–5.3)
POTASSIUM SERPL-SCNC: 4.4 MMOL/L (ref 3.7–5.3)
POTASSIUM SERPL-SCNC: 5.1 MMOL/L (ref 3.7–5.3)
POTASSIUM SERPL-SCNC: 5.3 MMOL/L (ref 3.7–5.3)
POTASSIUM SERPL-SCNC: 5.5 MMOL/L (ref 3.7–5.3)
SODIUM BLD-SCNC: 129 MMOL/L (ref 135–144)
SODIUM BLD-SCNC: 131 MMOL/L (ref 135–144)
SODIUM BLD-SCNC: 133 MMOL/L (ref 135–144)
SODIUM BLD-SCNC: 134 MMOL/L (ref 135–144)
SODIUM BLD-SCNC: 135 MMOL/L (ref 135–144)
SODIUM BLD-SCNC: 135 MMOL/L (ref 135–144)
SPECIMEN DESCRIPTION: NORMAL

## 2020-03-25 PROCEDURE — 6370000000 HC RX 637 (ALT 250 FOR IP): Performed by: NURSE PRACTITIONER

## 2020-03-25 PROCEDURE — 6370000000 HC RX 637 (ALT 250 FOR IP): Performed by: STUDENT IN AN ORGANIZED HEALTH CARE EDUCATION/TRAINING PROGRAM

## 2020-03-25 PROCEDURE — 97110 THERAPEUTIC EXERCISES: CPT

## 2020-03-25 PROCEDURE — 6370000000 HC RX 637 (ALT 250 FOR IP): Performed by: INTERNAL MEDICINE

## 2020-03-25 PROCEDURE — 6360000002 HC RX W HCPCS: Performed by: INTERNAL MEDICINE

## 2020-03-25 PROCEDURE — 2580000003 HC RX 258: Performed by: INTERNAL MEDICINE

## 2020-03-25 PROCEDURE — 83735 ASSAY OF MAGNESIUM: CPT

## 2020-03-25 PROCEDURE — 2060000000 HC ICU INTERMEDIATE R&B

## 2020-03-25 PROCEDURE — G0108 DIAB MANAGE TRN  PER INDIV: HCPCS

## 2020-03-25 PROCEDURE — 36415 COLL VENOUS BLD VENIPUNCTURE: CPT

## 2020-03-25 PROCEDURE — 80048 BASIC METABOLIC PNL TOTAL CA: CPT

## 2020-03-25 PROCEDURE — 51798 US URINE CAPACITY MEASURE: CPT

## 2020-03-25 PROCEDURE — 99233 SBSQ HOSP IP/OBS HIGH 50: CPT | Performed by: INTERNAL MEDICINE

## 2020-03-25 PROCEDURE — 84100 ASSAY OF PHOSPHORUS: CPT

## 2020-03-25 PROCEDURE — 2500000003 HC RX 250 WO HCPCS: Performed by: INTERNAL MEDICINE

## 2020-03-25 PROCEDURE — 51701 INSERT BLADDER CATHETER: CPT

## 2020-03-25 RX ORDER — SODIUM POLYSTYRENE SULFONATE 15 G/60ML
15 SUSPENSION ORAL; RECTAL ONCE
Status: COMPLETED | OUTPATIENT
Start: 2020-03-25 | End: 2020-03-25

## 2020-03-25 RX ORDER — ONDANSETRON 2 MG/ML
4 INJECTION INTRAMUSCULAR; INTRAVENOUS EVERY 6 HOURS PRN
Status: DISCONTINUED | OUTPATIENT
Start: 2020-03-25 | End: 2020-03-26 | Stop reason: HOSPADM

## 2020-03-25 RX ORDER — AMLODIPINE BESYLATE 10 MG/1
10 TABLET ORAL DAILY
Status: DISCONTINUED | OUTPATIENT
Start: 2020-03-26 | End: 2020-03-26 | Stop reason: HOSPADM

## 2020-03-25 RX ORDER — LACTULOSE 10 G/15ML
20 SOLUTION ORAL ONCE
Status: COMPLETED | OUTPATIENT
Start: 2020-03-25 | End: 2020-03-25

## 2020-03-25 RX ORDER — ONDANSETRON 4 MG/1
4 TABLET, FILM COATED ORAL ONCE
Status: COMPLETED | OUTPATIENT
Start: 2020-03-25 | End: 2020-03-25

## 2020-03-25 RX ORDER — HYDRALAZINE HYDROCHLORIDE 20 MG/ML
20 INJECTION INTRAMUSCULAR; INTRAVENOUS EVERY 6 HOURS PRN
Status: DISCONTINUED | OUTPATIENT
Start: 2020-03-25 | End: 2020-03-26 | Stop reason: HOSPADM

## 2020-03-25 RX ORDER — HYDRALAZINE HYDROCHLORIDE 25 MG/1
25 TABLET, FILM COATED ORAL EVERY 8 HOURS SCHEDULED
Status: DISCONTINUED | OUTPATIENT
Start: 2020-03-25 | End: 2020-03-26

## 2020-03-25 RX ORDER — ALOGLIPTIN 12.5 MG/1
12.5 TABLET, FILM COATED ORAL DAILY
Status: DISCONTINUED | OUTPATIENT
Start: 2020-03-25 | End: 2020-03-26 | Stop reason: HOSPADM

## 2020-03-25 RX ORDER — AMLODIPINE BESYLATE 5 MG/1
5 TABLET ORAL ONCE
Status: COMPLETED | OUTPATIENT
Start: 2020-03-25 | End: 2020-03-25

## 2020-03-25 RX ADMIN — ACETAMINOPHEN 1000 MG: 500 TABLET ORAL at 04:48

## 2020-03-25 RX ADMIN — ONDANSETRON 4 MG: 2 INJECTION INTRAMUSCULAR; INTRAVENOUS at 13:26

## 2020-03-25 RX ADMIN — HYDRALAZINE HYDROCHLORIDE 25 MG: 25 TABLET, FILM COATED ORAL at 13:54

## 2020-03-25 RX ADMIN — CALCIUM GLUCONATE 2 G: 98 INJECTION, SOLUTION INTRAVENOUS at 11:02

## 2020-03-25 RX ADMIN — AMLODIPINE BESYLATE 5 MG: 5 TABLET ORAL at 09:09

## 2020-03-25 RX ADMIN — AMLODIPINE BESYLATE 5 MG: 5 TABLET ORAL at 11:02

## 2020-03-25 RX ADMIN — INSULIN LISPRO 4 UNITS: 100 INJECTION, SOLUTION INTRAVENOUS; SUBCUTANEOUS at 09:09

## 2020-03-25 RX ADMIN — ALOGLIPTIN 12.5 MG: 12.5 TABLET, FILM COATED ORAL at 15:27

## 2020-03-25 RX ADMIN — INSULIN LISPRO 4 UNITS: 100 INJECTION, SOLUTION INTRAVENOUS; SUBCUTANEOUS at 11:12

## 2020-03-25 RX ADMIN — INSULIN LISPRO 10 UNITS: 100 INJECTION, SOLUTION INTRAVENOUS; SUBCUTANEOUS at 17:16

## 2020-03-25 RX ADMIN — ACETAMINOPHEN 1000 MG: 500 TABLET ORAL at 17:16

## 2020-03-25 RX ADMIN — HYDRALAZINE HYDROCHLORIDE 20 MG: 20 INJECTION INTRAMUSCULAR; INTRAVENOUS at 12:07

## 2020-03-25 RX ADMIN — OXYBUTYNIN CHLORIDE 5 MG: 5 TABLET ORAL at 09:08

## 2020-03-25 RX ADMIN — SODIUM POLYSTYRENE SULFONATE 15 G: 15 SUSPENSION ORAL; RECTAL at 10:56

## 2020-03-25 RX ADMIN — HYDRALAZINE HYDROCHLORIDE 25 MG: 25 TABLET, FILM COATED ORAL at 20:12

## 2020-03-25 RX ADMIN — Medication: at 12:59

## 2020-03-25 RX ADMIN — ACETAMINOPHEN 1000 MG: 500 TABLET ORAL at 11:03

## 2020-03-25 RX ADMIN — OXYBUTYNIN CHLORIDE 5 MG: 5 TABLET ORAL at 13:55

## 2020-03-25 RX ADMIN — ONDANSETRON HYDROCHLORIDE 4 MG: 4 TABLET, FILM COATED ORAL at 13:55

## 2020-03-25 RX ADMIN — CEFTRIAXONE SODIUM 2 G: 2 INJECTION, POWDER, FOR SOLUTION INTRAMUSCULAR; INTRAVENOUS at 15:27

## 2020-03-25 RX ADMIN — ATORVASTATIN CALCIUM 20 MG: 20 TABLET, FILM COATED ORAL at 09:09

## 2020-03-25 RX ADMIN — OXYBUTYNIN CHLORIDE 5 MG: 5 TABLET ORAL at 20:12

## 2020-03-25 RX ADMIN — Medication: at 12:56

## 2020-03-25 RX ADMIN — INSULIN LISPRO 2 UNITS: 100 INJECTION, SOLUTION INTRAVENOUS; SUBCUTANEOUS at 20:12

## 2020-03-25 RX ADMIN — LACTULOSE 20 G: 20 SOLUTION ORAL at 10:56

## 2020-03-25 RX ADMIN — ACETAMINOPHEN 1000 MG: 500 TABLET ORAL at 22:45

## 2020-03-25 RX ADMIN — FENOFIBRATE 160 MG: 160 TABLET ORAL at 09:09

## 2020-03-25 ASSESSMENT — PAIN SCALES - GENERAL
PAINLEVEL_OUTOF10: 6
PAINLEVEL_OUTOF10: 4
PAINLEVEL_OUTOF10: 5

## 2020-03-25 ASSESSMENT — PAIN DESCRIPTION - ORIENTATION
ORIENTATION: RIGHT
ORIENTATION_2: RIGHT
ORIENTATION: RIGHT

## 2020-03-25 ASSESSMENT — PAIN DESCRIPTION - INTENSITY: RATING_2: 6

## 2020-03-25 ASSESSMENT — PAIN DESCRIPTION - LOCATION
LOCATION: SHOULDER
LOCATION_2: RIB CAGE
LOCATION: SHOULDER

## 2020-03-25 ASSESSMENT — PAIN DESCRIPTION - PAIN TYPE
TYPE: SURGICAL PAIN
TYPE: ACUTE PAIN;SURGICAL PAIN

## 2020-03-25 NOTE — PLAN OF CARE
Problem: Pain:  Goal: Pain level will decrease  Description: Pain level will decrease  Outcome: Ongoing     Problem: Falls - Risk of:  Goal: Will remain free from falls  Description: Will remain free from falls  3/25/2020 1707 by Ketty Patrick RN  Outcome: Ongoing  3/25/2020 0406 by Jefferson Gordillo RN  Outcome: Ongoing     Problem: Discharge Planning:  Goal: Discharged to appropriate level of care  Description: Discharged to appropriate level of care  Outcome: Ongoing

## 2020-03-25 NOTE — PROGRESS NOTES
[facility]    Progress Note    3/25/2020    1:15 PM    Name:   Marvin Sanchez  MRN:     [id number]     [place]:      [de-identified]   Room:   [address] [place] [address] Day:  4  Admit Date:  3/21/2020  1:11 PM    PCP:   Jack Victoria MD  Code Status:  Prior    Subjective:     C/C:   Chief Complaint   Patient presents with    Shoulder Pain     Interval History Status: significantly improved. Patient was seen and examined this morning in no apparent distress. Patient's IV line had infiltrated around 7 PM and she still did not have an IV line in place when I saw and examine her around 11 AM.  This indicates patient did not have high bicarb drip overnight. She had been scheduled for a dose of Rocephin for a positive UA around 22:45. Lab investigations showed persistent RADHA with new hyperkalemia. She is s/p right reverse total shoulder arthroplasty for proximal humerus fracture, POD #2. Patient had been cleared for discharge by orthopedic surgery, however lab work investigation shows acute kidney injury with metabolic acidosis and therefore patient cannot be discharged today. This has been clearly explained to patient and she agrees to the plan. Brief History:     Marvin Sanchez is a 81 y.o. Non-/non  female who presents with Shoulder Pain and is admitted to the hospital for the management of Shoulder fracture, right, closed, initial encounter. This is a 81 yr old female who presents with proximal humerus fracture/dislocation s/p mechanical fall. Patient was at home walking in her kitchen when she tripped on an elevated heating vent subsequently falling on her left side. She immediately felt pain and presented to ER. Initial reduction done in ER with Ortho consult. Affected extremity placed in sling with plans for OR on Monday morning per Ortho. Patient has history of CAD and follows with Dr. Marce Wilks for outpatient cardiology.  Notes reviewed-- patient recently taken off carvedilol as it was causing significant hypotension and severe bradycardia. She also has history of insulin dependent diabetes    Review of Systems:     Constitutional:  negative for chills, fevers, sweats  Respiratory:  negative for cough, dyspnea on exertion, shortness of breath, wheezing  Cardiovascular:  negative for chest pain, chest pressure/discomfort, lower extremity edema, palpitations  Gastrointestinal:  negative for abdominal pain, constipation, diarrhea, nausea, vomiting  Neurological:  negative for dizziness, headache    Medications: Allergies:  No Known Allergies    Current Meds:   Scheduled Meds:    [START ON 3/26/2020] amLODIPine  10 mg Oral Daily    ondansetron  4 mg Oral Once    insulin lispro  0-12 Units Subcutaneous TID WC    insulin lispro  0-6 Units Subcutaneous Nightly    cefTRIAXone  1 g Intramuscular Once    oxybutynin  5 mg Oral TID    acetaminophen  1,000 mg Oral Q6H    [Held by provider] aspirin  81 mg Oral Daily    fenofibrate  160 mg Oral Daily    atorvastatin  20 mg Oral Daily     Continuous Infusions:    sodium bicarbonate infusion 100 mL/hr at 03/25/20 1259    dextrose      dextrose 5 % and 0.45 % NaCl 75 mL/hr at 03/23/20 2240    dextrose       PRN Meds: hydrALAZINE, ondansetron, glucose, dextrose, glucagon (rDNA), dextrose, oxyCODONE, oxyCODONE, dextrose, potassium chloride, magnesium sulfate, sodium phosphate IVPB **OR** sodium phosphate IVPB **OR** sodium phosphate IVPB, dextrose 5 % and 0.45 % NaCl, glucose, dextrose, glucagon (rDNA), dextrose    Data:     Past Medical History:   has a past medical history of Bundle branch block, right, Degenerative arthritis of lumbar spine, Diabetes mellitus ([address] Diverticulosis of intestine, Hyperlipidemia, Hypertension, Scoliosis of lumbosacral spine, and Snores. Social History:   reports that she has never smoked.  She has never used smokeless tobacco. She reports previous alcohol use. She reports that she does not use drugs. Family History:   Family History   Problem Relation Age of Onset    Heart Failure Mother     Diabetes Mother     Heart Failure Father     Diabetes Father     Heart Failure Brother        Vitals:  BP (!) 158/63   Pulse 56   Temp 98.2 °F (36.8 °C) (Oral)   Resp 20   Ht 5' 2\" (1.575 m)   Wt 210 lb (95.3 kg)   SpO2 92%   BMI 38.41 kg/m²   Temp (24hrs), Av.2 °F (36.8 °C), Min:97.3 °F (36.3 °C), Max:98.7 °F (37.1 °C)    Recent Labs     20  1632 20  0653 20  1112   POCGLU 228* 242* 247* 214*       I/O (24Hr): Intake/Output Summary (Last 24 hours) at 3/25/2020 1315  Last data filed at 3/25/2020 0303  Gross per 24 hour   Intake 1470 ml   Output 2471 ml   Net -1001 ml       Labs:  Hematology:  No results for input(s): WBC, RBC, HGB, HCT, MCV, MCH, MCHC, RDW, PLT, MPV, SEDRATE, CRP, INR, DDIMER, [id number], LABABSO in the last 72 hours.     Invalid input(s): PT  Chemistry:  Recent Labs     20  1723  20  2351  20  1843  20  0310 20  0544 20  1121   NA  --   --   --    < >  --    < > 129* 135 131*   K  --    < >  --    < >  --    < > 5.3 5.5* 5.1   CL  --   --   --    < >  --    < > 101 104 101   CO2  --   --   --    < >  --    < > 17* 19* 19*   GLUCOSE  --   --   --    < >  --    < > 276* 246* 227*   BUN  --   --   --    < >  --    < > 29* 30* 31*   CREATININE  --   --   --    < >  --    < > 1.07* 1.04* 0.98*   MG  --   --   --   --   --    < > 2.1 2.0 2.2   ANIONGAP  --   --   --    < >  --    < > 11 12 11   LABGLOM  --   --   --    < >  --    < > 51* 53* 57*   GFRAA  --   --   --    < >  --    < > >60 >60 >60   CALCIUM  --   --   --    < >  --    < > 8.6 8.6 9.0   PHOS  --   --   --   --   --    < > 3.7 3.5 3.0   TROPHS 15*  --  13  --   --   --   --   --   --    MYOGLOBIN 143*  --  94*  --   --   --   --   --   --    LACTACIDWB  --   --   --   --  1.9  --   --   --   --     < > = values in defect with possible involvement of the greater tuberosity. Ct Shoulder Right Wo Contrast    Result Date: 3/21/2020  Comminuted impacted proximal humerus fracture centered at the humeral head-neck junction. There is a dominant displaced fracture fragment superiorly likely representing the greater tuberosity. The humeral head articular surface is rotated approximately 90 degrees anteriorly and superiorly with the majority of articular surface not articulating with the glenoid. Subtle concavity of the inferior glenoid could represent an impaction fracture. No displaced glenoid fracture is seen. Fat stranding in the right axilla compatible with a hematoma. Xr Chest Portable    Result Date: 3/21/2020  No acute process. Ct 3d Reconstruction    Result Date: 3/21/2020  Comminuted impacted proximal humerus fracture centered at the humeral head-neck junction. There is a dominant displaced fracture fragment superiorly likely representing the greater tuberosity. The humeral head articular surface is rotated approximately 90 degrees anteriorly and superiorly with the majority of articular surface not articulating with the glenoid. Subtle concavity of the inferior glenoid could represent an impaction fracture. No displaced glenoid fracture is seen. Fat stranding in the right axilla compatible with a hematoma.        Physical Examination:        General Appearance: alert, well appearing, and in no acute distress  Mental status: oriented to person, place, and time  Head: normocephalic, atraumatic  Eye: no icterus, redness, pupils equal and reactive, extraocular eye movements intact, conjunctiva clear  Ear: normal external ear, no discharge, hearing intact  Nose: no drainage noted  Mouth: mucous membranes moist  Neck: supple, no carotid bruits, thyroid not palpable  Lungs: Bilateral equal air entry, clear to ausculation, no wheezing, rales or rhonchi, normal effort  Cardiovascular: normal rate, regular rhythm, no

## 2020-03-25 NOTE — PROGRESS NOTES
Orthopedic Progress Note    Patient:  Boo Porter  YOB: 1950     81 y.o. female    Subjective:  Patient seen and examined at bedside this morning. No complaints or concerns. Shoulder pain is improving   Patient with difficulty voiding per nursing. Denies fever, HA, CP, SOB, N/V, numbness/tingling   PT: Ambulated 20ft with PT yesterday     Objective:   Vitals:    03/25/20 0357   BP: (!) 154/76   Pulse: (!) 49   Resp: 17   Temp: 98.1 °F (36.7 °C)   SpO2: 97%     Gen: NAD, cooperative     RUE: surgical dressing in place, c/d/i w/o strikethrough bleeding or drainage. Dressing remove. Steri strips in place. Incision is well approximated without any erythema, drainage, or fluctuance. Ultrasling on. Patient is able to actively flex and extend the digits, Radial/median/ulnar/AIN/PIN/msk motor complex intact grossly. Superficial radial/median/ulnar/axillary sensory nerves SILT grossly. Radial pulse 2+. Recent Labs     03/22/20  0624  03/25/20  0310   WBC 11.2  --   --    HGB 11.5*  --   --    HCT 36.4  --   --    PLT See Reflexed IPF Result  --   --    NA  --    < > 129*   K  --    < > 5.3   BUN  --    < > 29*   CREATININE  --    < > 1.07*   GLUCOSE  --    < > 276*    < > = values in this interval not displayed. Meds: See rec for complete list    Impression/plan:69 y.o. female s/p right reverse total shoulder arthroplasty for proximal humerus fracture, POD #2     -Dressing changed at bedside 3/25. Daily dressing changes per nursing going forward   -Maintain sling at all times, ok to remove to to work on pendulums with PT and elbow, hand, and wrist ROM  -No lifting, pushing or pulling with the right arm  -Pain control PO/IV Medication. Attempt to Wean IV medications.  Cont in-patient pain regimen of 1000 mg PO Tylenol Q6H, Roxicodone 5-10 mg PO Q4H PRN, Toradol 30 mg IV x 3 doses  -Patient will be managed with [facility] outpatient, script in chart  -DVT ppx: EPC, no need for formal anticoagulation from orthopedic standpoint  -Urinalysis concerning for UTI. Recommend treatment in light of recent surgery.  Will defer abx selection to primary team   -Ice/Elevate for pain and swelling relief  -Encourage Incentive Spirometry use  -PT/OT to eval and treat  -Ok for DC from orthopedic standpoint once ok with primary team. Patient to follow up with Dr. Richard Felix 10-14 days after surgery  -Please page DO ortho with any questions    Loraine English DO   Orthopedic Surgery Resident PGY-2  [address] [facility]

## 2020-03-25 NOTE — PROGRESS NOTES
Physical Therapy  Facility/Department: [address] STEPDOWN  Daily Treatment Note  NAME: Leila Russ  : 1950  MRN: [id number]    Date of Service: 3/25/2020    Discharge Recommendations:    Further therapy recommended at discharge. Assessment   Body structures, Functions, Activity limitations: Decreased functional mobility ; Decreased endurance;Decreased strength;Decreased balance; Increased pain;Decreased ROM  Assessment: NWB RUE, May doff sling for pendulums and wrist/elbow/hand ROM, no lifting pushing, pulling or lifting with RUE. Pt reported having nausea and did not feel comfortable getting out of bed. Pt performed AAROM of Right UE: elbow/wrist/hand x20 with fair tolerance. Recommend continued therapy to improve RUE mobility and strength. Prognosis: Good  PT Education: Goals;PT Role;Plan of Care;General Safety;Home Exercise Program;Precautions  Patient Education: Importance of mobility within pt's precautions  REQUIRES PT FOLLOW UP: Yes  Activity Tolerance  Activity Tolerance: Patient limited by pain  Activity Tolerance: and nausea     Patient Diagnosis(es): The encounter diagnosis was Closed fracture dislocation of right shoulder, initial encounter. has a past medical history of Bundle branch block, right, Degenerative arthritis of lumbar spine, Diabetes mellitus ([facility] [address] Diverticulosis of intestine, Hyperlipidemia, Hypertension, Scoliosis of lumbosacral spine, and Snores. has a past surgical history that includes Hysterectomy;  section, classic; Knee Arthroplasty (Right, ); Cholecystectomy (); back surgery (13); Cardiac catheterization (); Total shoulder arthroplasty (Right, 2020); and Humerus fracture surgery (Right, 3/23/2020).     Restrictions  Restrictions/Precautions  Restrictions/Precautions: Fall Risk, Weight Bearing  Required Braces or Orthoses?: Yes  Upper Extremity Weight Bearing Restrictions  Right Upper Extremity Weight Bearing: Non Weight Bearing  Required Braces or Orthoses  Right Upper Extremity Brace/Splint: Sling  Position Activity Restriction  Other position/activity restrictions: NWB RUE, May doff sling for pendulums and wrist/elbow/hand ROM, no lifting pushing, pulling or lifting with RUE     Subjective   General  Chart Reviewed: Yes  Response To Previous Treatment: Patient with no complaints from previous session. Family / Caregiver Present: No  Subjective  Subjective: RN and pt agreeable to PT. Pt laying in bed upon arrival, pleasant and cooperative throughout. Pt reported pain, nausea, and not being comfortable getting out of bed at this time.   Pain Screening  Patient Currently in Pain: Yes  Pain Assessment  Pain Assessment: 0-10  Pain Level: 4  Pain Type: Acute pain;Surgical pain  Pain Location: Shoulder  Pain Orientation: Right  Non-Pharmaceutical Pain Intervention(s): Rest;Repositioned;Distraction  Multiple Pain Sites: Yes  Pain 2  Pain Rating 2: 6  Pain Location 2: Rib cage  Pain Orientation 2: Right  Non-Pharmaceutical Pain Intervention(s) 2: Repositioned;Rest;Distraction  Vital Signs  Patient Currently in Pain: Yes       Orientation  Orientation  Overall Orientation Status: Within Normal Limits    Objective    Exercises  Upper Extremity: AAROM of Right UE: elbow/wrist/hand x20       Goals  Short term goals  Time Frame for Short term goals: 14 visits  Short term goal 1: Perform AROM R hand/wrist/elbow to improve strength and ROM  Short term goal 2: Mod I for bed mobility and functional transfers   Short term goal 3: Ambulate 300ft independently  Short term goal 4: Ascend/descend 2 steps without HR and CGA  Short term goal 5: Demo Good dynamic standing balance    Plan    Plan  Times per week: 5-6x/wk  Current Treatment Recommendations: Strengthening, Balance Training, Functional Mobility Training, Transfer Training, Safety Education & Training, Home Exercise Program, Patient/Caregiver Education & Training, Gait Training, Endurance Training, ROM, Stair training  Safety Devices  Type of devices: Call light within reach, Nurse notified, All fall risk precautions in place, Left in bed  Restraints  Initially in place: No     Therapy Time   Individual Concurrent Group Co-treatment   Time In 1500         Time Out 1520         Minutes 20         Timed Code Treatment Minutes: [facility], \[facility]\""

## 2020-03-25 NOTE — PROGRESS NOTES
Zayra Lyn,  Phone conversation for evaluation and education on Type 2 uncontrolled    Lab Results   Component Value Date    LABA1C 8.8 (H) 03/24/2020     Lab Results   Component Value Date     03/24/2020       Discussed with Zayra Lyn, her current diabetes self-care routines prior to this admission. Pt has had DM  Along time and follows with and Endocrinologist. Pt does take Lantus 30 units 2x/d and uses the pen and rotates only to abd area. Pt does admit having some \"lumpy\" areas and receptive to rotation. Following Survival Skills education topics were covered as appropriate to patient plan of for care:  __x_ Type 2 Diabetes diagnosis as chronic and progressive       _x__ Healthy eating - CHO food groups and need for CHO controlled diet. Elimination of sugary beverages, avoid skipping meal  Main beverage is water and diet Pepsi.  helps with cooking meals but does not follow a CHO counting plan. x___ Role of physical activity - bouts of walking, swimming or low impact aerobics as recommended by care providers- aim for 30 minutes per day  Limited now with shoulder injury but up walking to bathroom. _x__ Blood Glucose Self-Monitoring ( BGSM)  Patient does check BG 2x/d at home. _x__ Blood sugar targets Pre meal 80 - 130 and 2 hours post meal < 180    _x__ Hyperglycemia  ( BG over 250) signs and symptoms and treatment    __ Sick Day Care    ___ Hypoglycemia( BG < than 70) signs and symptoms and treatment \"Rule of 15\"    _x__ Keep BG log and take log and meter to follow up HCP appointments    _x__ Medications -  Insulin Lantus - Basal ( long acting) / Humalog-  Bolus (pre meal)  regime - insulin action times.     _x__ Importance of dosing  pre meal rapid insulin from BG result at time of start of  meal & administering  within 15 minutes of eating meals   _x__ Importance of taking long acting insulin at same time each day and not to skip doses   x___Review of self-administering [facility]      Diabetes Education / HCP follow -up :   _x_ Would recommend follow -up education at outpatient diabetes education at [place] [address]. An ordered is needed for this service and can be placed via EHR. Discharge Navigator --- Med Reconciliation -- New orders for discharge tab -- search diabetic ed - REF20 -  [facility]  - review and sign     _x__ Follow -up with HCP / PCP within one week.     Deborah Marin RN

## 2020-03-25 NOTE — PROGRESS NOTES
Occupational Therapy    Occupational Therapy Not Seen Note    DATE: 3/25/2020  Name: Marvin Sanchez  : 1950  MRN: [id number]    Patient not available for Occupational Therapy due to: Other: Per RN, pt extremely nauseous, receiving nausea medications.         Electronically signed by JULIANNA Shelton on 3/25/2020 at 1:31 PM

## 2020-03-26 VITALS
RESPIRATION RATE: 17 BRPM | HEIGHT: 62 IN | OXYGEN SATURATION: 93 % | BODY MASS INDEX: 38.64 KG/M2 | WEIGHT: 210 LBS | DIASTOLIC BLOOD PRESSURE: 58 MMHG | HEART RATE: 90 BPM | TEMPERATURE: 98.4 F | SYSTOLIC BLOOD PRESSURE: 175 MMHG

## 2020-03-26 LAB
ANION GAP SERPL CALCULATED.3IONS-SCNC: 11 MMOL/L (ref 9–17)
ANION GAP SERPL CALCULATED.3IONS-SCNC: 13 MMOL/L (ref 9–17)
ANION GAP SERPL CALCULATED.3IONS-SCNC: 13 MMOL/L (ref 9–17)
BUN BLDV-MCNC: 20 MG/DL (ref 8–23)
BUN BLDV-MCNC: 22 MG/DL (ref 8–23)
BUN BLDV-MCNC: 25 MG/DL (ref 8–23)
BUN/CREAT BLD: ABNORMAL (ref 9–20)
CALCIUM SERPL-MCNC: 8.5 MG/DL (ref 8.6–10.4)
CALCIUM SERPL-MCNC: 8.5 MG/DL (ref 8.6–10.4)
CALCIUM SERPL-MCNC: 8.9 MG/DL (ref 8.6–10.4)
CHLORIDE BLD-SCNC: 100 MMOL/L (ref 98–107)
CHLORIDE BLD-SCNC: 97 MMOL/L (ref 98–107)
CHLORIDE BLD-SCNC: 99 MMOL/L (ref 98–107)
CO2: 21 MMOL/L (ref 20–31)
CO2: 24 MMOL/L (ref 20–31)
CO2: 26 MMOL/L (ref 20–31)
CREAT SERPL-MCNC: 0.82 MG/DL (ref 0.5–0.9)
CREAT SERPL-MCNC: 0.85 MG/DL (ref 0.5–0.9)
CREAT SERPL-MCNC: 0.86 MG/DL (ref 0.5–0.9)
GFR AFRICAN AMERICAN: >60 ML/MIN
GFR NON-AFRICAN AMERICAN: >60 ML/MIN
GFR SERPL CREATININE-BSD FRML MDRD: ABNORMAL ML/MIN/{1.73_M2}
GLUCOSE BLD-MCNC: 187 MG/DL (ref 65–105)
GLUCOSE BLD-MCNC: 225 MG/DL (ref 70–99)
GLUCOSE BLD-MCNC: 232 MG/DL (ref 70–99)
GLUCOSE BLD-MCNC: 242 MG/DL (ref 65–105)
GLUCOSE BLD-MCNC: 260 MG/DL (ref 70–99)
MAGNESIUM: 1.9 MG/DL (ref 1.6–2.6)
PHOSPHORUS: 2.6 MG/DL (ref 2.6–4.5)
PHOSPHORUS: 2.7 MG/DL (ref 2.6–4.5)
PHOSPHORUS: 2.7 MG/DL (ref 2.6–4.5)
POTASSIUM SERPL-SCNC: 4 MMOL/L (ref 3.7–5.3)
POTASSIUM SERPL-SCNC: 4 MMOL/L (ref 3.7–5.3)
POTASSIUM SERPL-SCNC: 4.3 MMOL/L (ref 3.7–5.3)
SODIUM BLD-SCNC: 134 MMOL/L (ref 135–144)
SODIUM BLD-SCNC: 134 MMOL/L (ref 135–144)
SODIUM BLD-SCNC: 136 MMOL/L (ref 135–144)

## 2020-03-26 PROCEDURE — 6370000000 HC RX 637 (ALT 250 FOR IP): Performed by: INTERNAL MEDICINE

## 2020-03-26 PROCEDURE — 6370000000 HC RX 637 (ALT 250 FOR IP): Performed by: STUDENT IN AN ORGANIZED HEALTH CARE EDUCATION/TRAINING PROGRAM

## 2020-03-26 PROCEDURE — 82947 ASSAY GLUCOSE BLOOD QUANT: CPT

## 2020-03-26 PROCEDURE — 36415 COLL VENOUS BLD VENIPUNCTURE: CPT

## 2020-03-26 PROCEDURE — 6370000000 HC RX 637 (ALT 250 FOR IP): Performed by: NURSE PRACTITIONER

## 2020-03-26 PROCEDURE — 2580000003 HC RX 258: Performed by: INTERNAL MEDICINE

## 2020-03-26 PROCEDURE — 84100 ASSAY OF PHOSPHORUS: CPT

## 2020-03-26 PROCEDURE — 97535 SELF CARE MNGMENT TRAINING: CPT

## 2020-03-26 PROCEDURE — 76937 US GUIDE VASCULAR ACCESS: CPT

## 2020-03-26 PROCEDURE — 2500000003 HC RX 250 WO HCPCS: Performed by: INTERNAL MEDICINE

## 2020-03-26 PROCEDURE — 6360000002 HC RX W HCPCS: Performed by: INTERNAL MEDICINE

## 2020-03-26 PROCEDURE — 99239 HOSP IP/OBS DSCHRG MGMT >30: CPT | Performed by: INTERNAL MEDICINE

## 2020-03-26 PROCEDURE — 83735 ASSAY OF MAGNESIUM: CPT

## 2020-03-26 PROCEDURE — 80048 BASIC METABOLIC PNL TOTAL CA: CPT

## 2020-03-26 RX ORDER — CEFDINIR 300 MG/1
300 CAPSULE ORAL 2 TIMES DAILY
Qty: 10 CAPSULE | Refills: 0 | Status: SHIPPED | OUTPATIENT
Start: 2020-03-26 | End: 2020-03-26 | Stop reason: HOSPADM

## 2020-03-26 RX ORDER — HYDRALAZINE HYDROCHLORIDE 25 MG/1
25 TABLET, FILM COATED ORAL ONCE
Status: COMPLETED | OUTPATIENT
Start: 2020-03-26 | End: 2020-03-26

## 2020-03-26 RX ORDER — HYDRALAZINE HYDROCHLORIDE 50 MG/1
50 TABLET, FILM COATED ORAL EVERY 12 HOURS SCHEDULED
Qty: 60 TABLET | Refills: 0 | Status: SHIPPED | OUTPATIENT
Start: 2020-03-26

## 2020-03-26 RX ORDER — HYDRALAZINE HYDROCHLORIDE 50 MG/1
50 TABLET, FILM COATED ORAL EVERY 12 HOURS SCHEDULED
Status: DISCONTINUED | OUTPATIENT
Start: 2020-03-26 | End: 2020-03-26 | Stop reason: HOSPADM

## 2020-03-26 RX ORDER — INSULIN GLARGINE 100 [IU]/ML
30 INJECTION, SOLUTION SUBCUTANEOUS 2 TIMES DAILY
Status: DISCONTINUED | OUTPATIENT
Start: 2020-03-26 | End: 2020-03-26 | Stop reason: HOSPADM

## 2020-03-26 RX ORDER — CARVEDILOL 6.25 MG/1
6.25 TABLET ORAL 2 TIMES DAILY WITH MEALS
Status: DISCONTINUED | OUTPATIENT
Start: 2020-03-26 | End: 2020-03-26 | Stop reason: HOSPADM

## 2020-03-26 RX ORDER — CEPHALEXIN 500 MG/1
500 CAPSULE ORAL 2 TIMES DAILY
Qty: 14 CAPSULE | Refills: 0 | Status: SHIPPED | OUTPATIENT
Start: 2020-03-26 | End: 2020-03-31

## 2020-03-26 RX ORDER — AMOXICILLIN 250 MG
2 CAPSULE ORAL 2 TIMES DAILY PRN
Qty: 30 TABLET | Refills: 0 | Status: SHIPPED | OUTPATIENT
Start: 2020-03-26

## 2020-03-26 RX ADMIN — HYDRALAZINE HYDROCHLORIDE 20 MG: 20 INJECTION INTRAMUSCULAR; INTRAVENOUS at 04:38

## 2020-03-26 RX ADMIN — OXYBUTYNIN CHLORIDE 5 MG: 5 TABLET ORAL at 07:56

## 2020-03-26 RX ADMIN — Medication: at 01:29

## 2020-03-26 RX ADMIN — ALOGLIPTIN 12.5 MG: 12.5 TABLET, FILM COATED ORAL at 07:56

## 2020-03-26 RX ADMIN — INSULIN LISPRO 2 UNITS: 100 INJECTION, SOLUTION INTRAVENOUS; SUBCUTANEOUS at 12:59

## 2020-03-26 RX ADMIN — INSULIN LISPRO 4 UNITS: 100 INJECTION, SOLUTION INTRAVENOUS; SUBCUTANEOUS at 09:28

## 2020-03-26 RX ADMIN — AMLODIPINE BESYLATE 10 MG: 10 TABLET ORAL at 07:56

## 2020-03-26 RX ADMIN — INSULIN GLARGINE 30 UNITS: 100 INJECTION, SOLUTION SUBCUTANEOUS at 15:36

## 2020-03-26 RX ADMIN — OXYBUTYNIN CHLORIDE 5 MG: 5 TABLET ORAL at 12:58

## 2020-03-26 RX ADMIN — HYDRALAZINE HYDROCHLORIDE 20 MG: 20 INJECTION INTRAMUSCULAR; INTRAVENOUS at 09:43

## 2020-03-26 RX ADMIN — HYDRALAZINE HYDROCHLORIDE 25 MG: 25 TABLET, FILM COATED ORAL at 06:53

## 2020-03-26 RX ADMIN — CEFTRIAXONE SODIUM 2 G: 2 INJECTION, POWDER, FOR SOLUTION INTRAMUSCULAR; INTRAVENOUS at 12:58

## 2020-03-26 RX ADMIN — FENOFIBRATE 160 MG: 160 TABLET ORAL at 07:56

## 2020-03-26 RX ADMIN — ACETAMINOPHEN 1000 MG: 500 TABLET ORAL at 15:36

## 2020-03-26 RX ADMIN — HYDRALAZINE HYDROCHLORIDE 25 MG: 50 TABLET, FILM COATED ORAL at 11:40

## 2020-03-26 RX ADMIN — CARVEDILOL 6.25 MG: 6.25 TABLET, FILM COATED ORAL at 12:59

## 2020-03-26 RX ADMIN — ATORVASTATIN CALCIUM 20 MG: 20 TABLET, FILM COATED ORAL at 07:56

## 2020-03-26 RX ADMIN — ACETAMINOPHEN 1000 MG: 500 TABLET ORAL at 04:30

## 2020-03-26 ASSESSMENT — PAIN SCALES - GENERAL
PAINLEVEL_OUTOF10: 5
PAINLEVEL_OUTOF10: 5
PAINLEVEL_OUTOF10: 1

## 2020-03-26 ASSESSMENT — PAIN DESCRIPTION - DESCRIPTORS: DESCRIPTORS: ACHING

## 2020-03-26 ASSESSMENT — PAIN DESCRIPTION - FREQUENCY: FREQUENCY: CONTINUOUS

## 2020-03-26 ASSESSMENT — PAIN DESCRIPTION - PAIN TYPE: TYPE: SURGICAL PAIN

## 2020-03-26 ASSESSMENT — PAIN DESCRIPTION - ORIENTATION: ORIENTATION: RIGHT

## 2020-03-26 ASSESSMENT — PAIN DESCRIPTION - LOCATION: LOCATION: SHOULDER

## 2020-03-26 ASSESSMENT — PAIN DESCRIPTION - PROGRESSION: CLINICAL_PROGRESSION: NOT CHANGED

## 2020-03-26 NOTE — PLAN OF CARE
Problem: Falls - Risk of:  Goal: Will remain free from falls  Description: Will remain free from falls  3/26/2020 0458 by Yesi Payne RN  Outcome: Ongoing   Pt calls out appropriately for assistance, bed in low position, non-skid socks in place, room free of clutter.  Standby assist.     Electronically signed by Mariela Baez RN on 3/26/2020 at 4:59 AM

## 2020-03-26 NOTE — DISCHARGE SUMMARY
Mai Giles [facility]    Discharge Summary     Patient ID: Terry Zayas  :  1950   MRN: [id number]     ACCOUNT:  [de-identified]   Patient's PCP: Mauricio Patricia MD  Admit Date: 3/21/2020   Discharge Date: 3/26/2020   Length of Stay: 5  Code Status:  Prior  Admitting Physician: Frances Hamlin MD  Discharge Physician: Darshan Rhoades MD     Active Discharge Diagnoses:     Hospital Problem Lists:  Principal Problem:    RADHA (acute kidney injury) [facility])  Active Problems:    Diabetes mellitus ([facility] [address]    Mixed hyperlipidemia    Closed dislocation of right shoulder    Essential hypertension    Shoulder fracture, right, closed, initial encounter    Metabolic acidosis    Acute cystitis without hematuria    Hyperkalemia, diminished renal excretion    Sinus bradycardia  Resolved Problems:    * No resolved hospital problems. *      Admission Condition:  poor     Discharged Condition: good    Hospital Stay:     Hospital Course:  Terry Zayas is a 81 y.o. female who was admitted for the management of  RADHA (acute kidney injury) [facility]) , presented to ER with Shoulder Pain  Terry Zayas is a 81 y.o. Non-/non  female who presents with Shoulder Pain and is admitted to the hospital for the management of Shoulder fracture, right, closed, initial encounter. This is a 81 yr old female who presents with proximal humerus fracture/dislocation s/p mechanical fall. Patient was at home walking in her kitchen when she tripped on an elevated heating vent subsequently falling on her left side. She immediately felt pain and presented to ER. Initial reduction done in ER with Ortho consult. Affected extremity placed in sling with plans for OR on Monday morning per Ortho. Patient has history of CAD and follows with Dr. Suresh Arias for outpatient cardiology.  Notes reviewed-- patient recently taken off carvedilol as it was causing significant hypotension and severe bradycardia. She also has history of insulin dependent diabetes. Patient had been admitted as indicated above and was found to have a Right 4-part proximal humerus fracture-dislocation. She had been seen by orthopedic surgery consults, who did a right reverse shoulder arthroplasty. Postoperatively, patient had developed acute kidney injury with a high anion gap metabolic acidosis. She had been hypotensive overnight postop. Her antihypertensives were held and patient was maintained on sodium bicarbonate drip, with intermittent stoppage due to IV lines going back. Subsequently patient's renal function today is markedly improved. Her blood sugar has become uncontrolled because her oral hypoglycemics including Januvia and sulfonylurea were held. Patient has shown marked improvement and with adjustment to her antihypertensive regimen, blood pressure is under control and renal function is back to normal today. She will be discharged home with home health and will be following up with a primary care physician as well as orthopedic surgery for further work-up and management. Her UA had been positive for UTI and patient was maintained on empiric ceftriaxone pending culture and sensitivity report, which is not back yet. However patient is insistent on being discharged home and therefore will be discharged on p.o. Keflex. She is being discharged in a stable state.     Physical Examination:         General Appearance: alert, well appearing, and in no acute distress  Mental status: oriented to person, place, and time  Head: normocephalic, atraumatic  Eye: no icterus, redness, pupils equal and reactive, extraocular eye movements intact, conjunctiva clear  Ear: normal external ear, no discharge, hearing intact  Nose: no drainage noted  Mouth: mucous membranes moist  Neck: supple, no carotid bruits, thyroid not palpable  Lungs: Bilateral equal air entry, clear to ausculation, no wheezing, rales or rhonchi, normal effort  Cardiovascular: normal rate, regular rhythm, no murmur, gallop, rub  Abdomen: Soft, nontender, nondistended, normal bowel sounds, no hepatomegaly or splenomegaly  Neurologic: There are no new focal motor or sensory deficits, normal muscle tone and bulk, no abnormal sensation, normal speech, cranial nerves II through XII grossly intact  Skin: No gross lesions, rashes, bruising or bleeding on exposed skin area  Extremities: peripheral pulses palpable, no pedal edema or calf pain with palpation, right shoulder wound with dressing in place with no bleeding or discharge, sling in place  Psych: normal affect      Significant therapeutic interventions: Right Reverse Shoulder Arthroplasty (32574)    Significant Diagnostic Studies:   Labs / Micro:  CBC:   Lab Results   Component Value Date    WBC 11.2 03/22/2020    RBC 3.72 03/22/2020    HGB 11.5 03/22/2020    HCT 36.4 03/22/2020    MCV 97.8 03/22/2020    MCH 30.9 03/22/2020    MCHC 31.6 03/22/2020    RDW 12.9 03/22/2020    PLT See Reflexed IPF Result 03/22/2020     BMP:    Lab Results   Component Value Date    GLUCOSE 260 03/26/2020     03/26/2020    K 4.3 03/26/2020    CL 97 03/26/2020    CO2 26 03/26/2020    ANIONGAP 11 03/26/2020    BUN 22 03/26/2020    CREATININE 0.82 03/26/2020    BUNCRER NOT REPORTED 03/26/2020    CALCIUM 8.9 03/26/2020    LABGLOM >60 03/26/2020    GFRAA >60 03/26/2020    GFR      03/26/2020    GFR NOT REPORTED 03/26/2020     HFP:    Lab Results   Component Value Date    PROT 7.2 03/22/2020     PT/INR:    Lab Results   Component Value Date    PROTIME 10.4 03/21/2020    INR 1.0 03/21/2020     PTT:   Lab Results   Component Value Date    APTT 18.6 03/21/2020     FLP:    Lab Results   Component Value Date    CHOL 125 03/21/2020    TRIG 133 03/21/2020    HDL 37 03/21/2020     U/A:    Lab Results   Component Value Date    COLORU YELLOW 03/24/2020    TURBIDITY TURBID 03/24/2020    SPECGRAV 1.013 03/24/2020    HGBUR MODERATE 03/24/2020    PHUR 5.0 03/24/2020    PROTEINU TRACE 03/24/2020    GLUCOSEU 2+ 03/24/2020    KETUA NEGATIVE 03/24/2020    BILIRUBINUR NEGATIVE 03/24/2020    UROBILINOGEN Normal 03/24/2020    NITRU NEGATIVE 03/24/2020    LEUKOCYTESUR LARGE 03/24/2020     TSH:  No results found for: TSH     Radiology:  Xr Chest (single View Frontal)    Result Date: 3/23/2020  Perihilar vascular congestion. Interval right total shoulder arthroplasty. Xr Shoulder Right 1 Vw    Result Date: 3/21/2020  Sequential images of the right shoulder demonstrating eventual reduction of the right shoulder. Large displaced fracture along the superior margin of the humeral head. Xr Shoulder Right 1 Vw    Result Date: 3/21/2020  Sequential images of the right shoulder demonstrating eventual reduction of the right shoulder. Large displaced fracture along the superior margin of the humeral head. Xr Shoulder Right (min 2 Views)    Result Date: 3/23/2020  Intact reverse shoulder arthroplasty. Xr Shoulder Right (min 2 Views)    Result Date: 3/21/2020  Anterior subcoracoid humeral head dislocation. Prominent fracture fragment projecting near the inferior glenoid suspicious for a Hill-Sachs defect with possible involvement of the greater tuberosity. Xr Shoulder Right (min 2 Views)    Result Date: 3/21/2020  Sequential images of the right shoulder demonstrating eventual reduction of the right shoulder. Large displaced fracture along the superior margin of the humeral head. Xr Humerus Right (min 2 Views)    Result Date: 3/21/2020  Anterior subcoracoid humeral head dislocation. Prominent fracture fragment projecting near the inferior glenoid suspicious for a Hill-Sachs defect with possible involvement of the greater tuberosity. Ct Shoulder Right Wo Contrast    Result Date: 3/21/2020  Comminuted impacted proximal humerus fracture centered at the humeral head-neck junction.   There is a dominant displaced fracture fragment superiorly likely representing these medications    naproxen 500 MG tablet  Commonly known as:  NAPROSYN     oxyCODONE-acetaminophen 5-325 MG per tablet  Commonly known as:  PERCOCET           Where to Get Your Medications      These medications were sent to [address], [address] [place] [address],[address]    Phone:  [phone]   · cephALEXin 500 MG capsule  · hydrALAZINE 50 MG tablet  · senna-docusate 8.6-50 MG per tablet     You can get these medications from any pharmacy    Bring a paper prescription for each of these medications  · HYDROcodone-acetaminophen 5-325 MG per tablet  · vitamin D 1.25 MG (41142 UT) Caps capsule         No discharge procedures on file. Time Spent on discharge is  36 mins in patient examination, evaluation, counseling as well as medication reconciliation, prescriptions for required medications, discharge plan and follow up. Electronically signed by   Judy Abad MD  3/26/2020  12:39 PM      Thank you Dr. Christiana Leonard MD for the opportunity to be involved in this patient's care.

## 2020-03-26 NOTE — CARE COORDINATION
Discharge order noted. Met with patient to discuss home health. She is agreeable. Given choice, referral sent to [facility]. 1505 - Call to [facility], spoke to Zachary Miller, notified of discharge home today. They will retrieve info from [address] [place] [address] Rd.      Discharge [facility]  Written by: Jenna Marie RN    Patient Name: Lucy Fisher  Attending Provider: Rae Downing MD  Admit Date: 3/21/2020  1:11 PM  MRN: [id number]  Account: [de-identified]                     : 1950  Discharge Date:  20       Disposition: home with Zahida Garcia RN

## 2020-03-26 NOTE — DISCHARGE INSTR - COC
Continuity of Care Form    Patient Name: Santiago Yen   :  1950  MRN:  [id number]    Admit date:  3/21/2020  Discharge date:  3/26/2020    Code Status Order: Prior   Advance Directives:   [facility] Documentation     Date/Time Healthcare Directive Type of Healthcare Directive Copy in [facility] [address] Agent's Name Healthcare Agent's Phone Number    20 [phone]  No, patient does not have an advance directive for healthcare treatment -- -- -- -- --    20  No, patient does not have an advance directive for healthcare treatment -- -- -- -- --          Admitting Physician:  Thaddeus Sinha MD  PCP: Travis Catherine MD    Discharging Nurse: [facility]/Room#: 4847/3505-11  Discharging Unit Phone Number: [phone]    Emergency Contact:   Extended Emergency Contact Information  Primary Emergency Contact: BergmanElian  Address: [address] Phone: [phone]  Relation: Spouse  Secondary Emergency Contact: Amor Michelle  Mobile Phone: [phone]  Relation: Child    Past Surgical History:  Past Surgical History:   Procedure Laterality Date    BACK SURGERY  13    l4-5/l5-s1 laminotomies and formanotomies    CARDIAC CATHETERIZATION      coronary artery block-- minimal     SECTION, CLASSIC      2 times    CHOLECYSTECTOMY  1984    HUMERUS FRACTURE SURGERY Right 3/23/2020     REVERSE TOTAL SHOULDER ARTHROPLASTY - performed by Kyle Vieira MD at [address] [place] [address] Right     pt denies , states was meniscus repair only    SHOULDER ARTHROPLASTY Right 2020    reverse       Immunization History: There is no immunization history on file for this patient.     Active Problems:  Patient Active Problem List   Diagnosis Code    Scoliosis of lumbosacral spine M41.9    Diabetes mellitus ([facility] [address] E11.9    Bundle branch block, right I45.10    Mixed hyperlipidemia E78.2    Closed dislocation of right shoulder S43.004A    Essential hypertension I10    Shoulder fracture, right, closed, initial encounter S42. 91XA    RADHA (acute kidney injury) ([facility] [address] K02.7    Metabolic acidosis O12.7    Acute cystitis without hematuria N30.00    Hyperkalemia, diminished renal excretion E87.5    Sinus bradycardia R00.1       Isolation/Infection:   Isolation          No Isolation        Patient Infection Status     None to display          Nurse Assessment:  Last Vital Signs: BP (!) 175/58   Pulse 90   Temp 98.4 °F (36.9 °C) (Oral)   Resp 17   Ht 5' 2\" (1.575 m)   Wt 210 lb (95.3 kg)   SpO2 93%   BMI 38.41 kg/m²     Last documented pain score (0-10 scale): Pain Level: 5  Last Weight:   Wt Readings from Last 1 Encounters:   03/21/20 210 lb (95.3 kg)     Mental Status:  oriented, alert, coherent, logical, thought processes intact and able to concentrate and follow conversation    IV Access:  - None    Nursing Mobility/ADLs:  Walking   Independent  Transfer  Assisted  Bathing  Assisted  Dressing  Assisted  Toileting  Assisted  Feeding  Independent  Med Admin  Independent  Med Delivery   whole    Wound Care Documentation and Therapy:        Elimination:  Continence:   · Bowel: Yes  · Bladder: Yes  Urinary Catheter: None   Colostomy/Ileostomy/Ileal Conduit: No       Date of Last BM: 3/26/2020    Intake/Output Summary (Last 24 hours) at 3/26/2020 1237  Last data filed at 3/25/2020 1718  Gross per 24 hour   Intake 1065 ml   Output 475 ml   Net 590 ml     I/O last 3 completed shifts: In: 1065 [P.O.:540; I.V.:525]  Out: 475 [Urine:475]    Safety Concerns:      At Risk for Falls    Impairments/Disabilities:      None    Nutrition Therapy:  Current Nutrition Therapy:   - Oral Diet:  Carb Control 5 carbs/meal (2000kcals/day)    Routes of Feeding: Oral  Liquids: No Restrictions  Daily Fluid Restriction: no  Last Modified Barium Swallow with Video (Video Swallowing Test): not done    Treatments at

## 2020-03-26 NOTE — PROGRESS NOTES
Occupational Therapy  Facility/Department: [facility] 4B STEPDOWN  Daily Treatment Note  NAME: Marisol Chambers  : 1950  MRN: [id number]    Date of Service: 3/26/2020    Discharge Recommendations:  Patient would benefit from continued therapy after discharge       Assessment   Performance deficits / Impairments: Decreased functional mobility ; Decreased endurance;Decreased ADL status; Decreased safe awareness;Decreased high-level IADLs;Decreased ROM  Treatment Diagnosis: S/P Fall with Anterior Subcoracoid Humeral Head Dislocation  Prognosis: Good  REQUIRES OT FOLLOW UP: Yes  Activity Tolerance  Activity Tolerance: Patient Tolerated treatment well;Patient limited by pain  Safety Devices  Safety Devices in place: Yes  Type of devices: Bed alarm in place;Call light within reach; Patient at risk for falls; Left in bed;Nurse notified         Patient Diagnosis(es): The encounter diagnosis was Closed fracture dislocation of right shoulder, initial encounter. has a past medical history of Bundle branch block, right, Degenerative arthritis of lumbar spine, Diabetes mellitus ([facility] [address] Diverticulosis of intestine, Hyperlipidemia, Hypertension, Scoliosis of lumbosacral spine, and Snores. has a past surgical history that includes Hysterectomy;  section, classic; Knee Arthroplasty (Right, ); Cholecystectomy (); back surgery (13); Cardiac catheterization (); Total shoulder arthroplasty (Right, 2020); and Humerus fracture surgery (Right, 3/23/2020).     Restrictions  Restrictions/Precautions  Restrictions/Precautions: Weight Bearing, ROM Restrictions, Surgical Protocols, Fall Risk, Up as Tolerated  Required Braces or Orthoses?: Yes(Shoulder sling RUE)  Upper Extremity Weight Bearing Restrictions  Right Upper Extremity Weight Bearing: Non Weight Bearing  Required Braces or Orthoses  Right Upper Extremity Brace/Splint: Sling  Position Activity Restriction  Other position/activity restrictions: NWB RUE, May doff sling for pendulums and wrist/elbow/hand ROM, no lifting pushing, pulling or lifting with RUE  Subjective   General  Patient assessed for rehabilitation services?: Yes  Family / Caregiver Present: No  Pain Assessment  Pain Assessment: 0-10  Pain Level: 5  Patient's Stated Pain Goal: No pain  Pain Type: Surgical pain  Pain Location: Shoulder  Pain Orientation: Right  Pain Descriptors: Aching  Pain Frequency: Continuous  Clinical Progression: Not changed  Response to Pain Intervention: Patient Satisfied  Vital Signs  Patient Currently in Pain: Yes   Objective    ADL  Feeding: Minimal assistance;Setup; Increased time to complete(Needs assist to open containers, cut food, etc. )  Grooming: Stand by assistance;Minimal assistance;Setup; Increased time to complete(Min-w/putting toothpaste on toothbrush,Max-brushing hair & pull up in pony tail,SBA-wash face)  UE Bathing: Moderate assistance;Setup; Increased time to complete(pt would need assist w/back, RUE and sides)  LE Bathing: Moderate assistance;Setup; Increased time to complete(assist needed to wash lower legs and feet)  UE Dressing: Maximum assistance;Setup; Increased time to complete(assist w/gown and sling )  LE Dressing: Maximum assistance;Setup; Increased time to complete(w/footies)  Toileting: Stand by assistance;Setup; Increased time to complete(pt able to wipe self after toileting)      Pt in bed upon arrival. Pt declined bathing at this time but was able to simulate self care while sitting up in bed, HOB elevated (see above for LOF). Educ on TSA prec, Fall Prev, Safety with func mob and adls. And use of sx soap when self care is done and pt verbalized understanding. Pt setup at tray table for grooming (see above for LOF). Pt wanting to use toilet, bed mob and transferred completed and retired back to bed (declined sitting up in recliner). Pt needing increased time to complete all tasks d/t pain, fatigue and needed rest breaks.  Pt doffed O2 during grooming and wanted to keep off, SpO2 @92-93% throughout tx. RN notified of IV connected to pt but IV not on. Pt states has spouse at home to assist w/self care. RN would benefit from shower chair and LH sponge when allowed to shower.       Balance  Sitting Balance: Supervision  Standing Balance: Contact guard assistance  Standing Balance  Time: Pt stood for approx 3 min for transfers and joy care  Comment: No LOB  Functional Mobility  Functional - Mobility Device: No device  Assist Level: Contact guard assistance  Functional Mobility Comments: No LOB  Toilet Transfers  Toilet - Technique: Ambulating  Toilet Transfer: Contact guard assistance  Bed mobility  Supine to Sit: Minimal assistance  Sit to Supine: Minimal assistance  Scooting: Stand by assistance  Comment: HOB elevated  Transfers  Stand Step Transfers: Contact guard assistance  Sit to stand: Contact guard assistance  Stand to sit: Contact guard assistance      Plan   Plan  Times per week: 3-5 x week    Goals  Short term goals  Time Frame for Short term goals: Pt will, by discharge:  Short term goal 1: Dem I with bed mobility  Short term goal 2: Dem I with functional transfers  Short term goal 3: Dem min a for adl perfromance  Short term goal 4: Dem good safety awareness tech for all transfers/mobility  Short term goal 5: Dem a 10 min dynamic task with SBA to increase activity tolerance  Short term goal 6: Dem RUE pendulum, elbow, wrist and digit AROM exercises Leida  Short term goal 7: Dem I with R  exercises each session      Therapy Time   Individual Concurrent Group Co-treatment   Time In  1020         Time Out  1135         Minutes  75 total tx time                 ALEKSANDR LUTZ, DELEON/L

## 2020-03-27 ENCOUNTER — TELEPHONE (OUTPATIENT)
Dept: ORTHOPEDIC SURGERY | Age: 70
End: 2020-03-27

## 2020-04-07 ENCOUNTER — OFFICE VISIT (OUTPATIENT)
Dept: ORTHOPEDIC SURGERY | Age: 70
End: 2020-04-07

## 2020-04-07 VITALS — WEIGHT: 210 LBS | HEIGHT: 62 IN | BODY MASS INDEX: 38.64 KG/M2

## 2020-04-07 PROCEDURE — 99024 POSTOP FOLLOW-UP VISIT: CPT | Performed by: ORTHOPAEDIC SURGERY

## 2020-04-23 ENCOUNTER — HOSPITAL ENCOUNTER (OUTPATIENT)
Age: 70
Setting detail: SPECIMEN
Discharge: HOME | End: 2020-04-23
Payer: MEDICARE

## 2020-04-23 LAB
ABSOLUTE EOS #: 0.64 K/UL (ref 0–0.44)
ABSOLUTE IMMATURE GRANULOCYTE: 0.03 K/UL (ref 0–0.3)
ABSOLUTE LYMPH #: 0.73 K/UL (ref 1.1–3.7)
ABSOLUTE MONO #: 0.53 K/UL (ref 0.1–1.2)
ALBUMIN SERPL-MCNC: 4 G/DL (ref 3.5–5.2)
ALBUMIN/GLOBULIN RATIO: 1.1 (ref 1–2.5)
ALP BLD-CCNC: 62 U/L (ref 35–104)
ALT SERPL-CCNC: 14 U/L (ref 5–33)
ANION GAP SERPL CALCULATED.3IONS-SCNC: 11 MMOL/L (ref 9–17)
AST SERPL-CCNC: 17 U/L
BASOPHILS # BLD: 1 % (ref 0–2)
BASOPHILS ABSOLUTE: 0.06 K/UL (ref 0–0.2)
BILIRUB SERPL-MCNC: 0.22 MG/DL (ref 0.3–1.2)
BUN BLDV-MCNC: 10 MG/DL (ref 8–23)
BUN/CREAT BLD: ABNORMAL (ref 9–20)
C-PEPTIDE: 2.8 NG/ML (ref 1.1–4.4)
CALCIUM SERPL-MCNC: 9.2 MG/DL (ref 8.6–10.4)
CHLORIDE BLD-SCNC: 106 MMOL/L (ref 98–107)
CHOLESTEROL/HDL RATIO: 2.8
CHOLESTEROL: 95 MG/DL
CO2: 22 MMOL/L (ref 20–31)
CREAT SERPL-MCNC: 0.72 MG/DL (ref 0.5–0.9)
DIFFERENTIAL TYPE: ABNORMAL
EOSINOPHILS RELATIVE PERCENT: 11 % (ref 1–4)
GFR AFRICAN AMERICAN: >60 ML/MIN
GFR NON-AFRICAN AMERICAN: >60 ML/MIN
GFR SERPL CREATININE-BSD FRML MDRD: ABNORMAL ML/MIN/{1.73_M2}
GFR SERPL CREATININE-BSD FRML MDRD: ABNORMAL ML/MIN/{1.73_M2}
GLUCOSE BLD-MCNC: 124 MG/DL (ref 70–99)
HCT VFR BLD CALC: 33.3 % (ref 36.3–47.1)
HDLC SERPL-MCNC: 34 MG/DL
HEMOGLOBIN: 10.5 G/DL (ref 11.9–15.1)
IMMATURE GRANULOCYTES: 1 %
LDL CHOLESTEROL: 36 MG/DL (ref 0–130)
LYMPHOCYTES # BLD: 12 % (ref 24–43)
MCH RBC QN AUTO: 29.2 PG (ref 25.2–33.5)
MCHC RBC AUTO-ENTMCNC: 31.5 G/DL (ref 28.4–34.8)
MCV RBC AUTO: 92.5 FL (ref 82.6–102.9)
MONOCYTES # BLD: 9 % (ref 3–12)
NRBC AUTOMATED: 0 PER 100 WBC
PDW BLD-RTO: 12.8 % (ref 11.8–14.4)
PLATELET # BLD: 316 K/UL (ref 138–453)
PLATELET ESTIMATE: ABNORMAL
PMV BLD AUTO: 10.3 FL (ref 8.1–13.5)
POTASSIUM SERPL-SCNC: 4.6 MMOL/L (ref 3.7–5.3)
RBC # BLD: 3.6 M/UL (ref 3.95–5.11)
RBC # BLD: ABNORMAL 10*6/UL
SEG NEUTROPHILS: 66 % (ref 36–65)
SEGMENTED NEUTROPHILS ABSOLUTE COUNT: 4.02 K/UL (ref 1.5–8.1)
SODIUM BLD-SCNC: 139 MMOL/L (ref 135–144)
T3 FREE: 2.92 PG/ML (ref 2.02–4.43)
THYROXINE, FREE: 1.53 NG/DL (ref 0.93–1.7)
TOTAL CK: 33 U/L (ref 26–192)
TOTAL PROTEIN: 7.5 G/DL (ref 6.4–8.3)
TRIGL SERPL-MCNC: 125 MG/DL
TSH SERPL DL<=0.05 MIU/L-ACNC: 0.85 MIU/L (ref 0.3–5)
VITAMIN B-12: 151 PG/ML (ref 232–1245)
VLDLC SERPL CALC-MCNC: ABNORMAL MG/DL (ref 1–30)
WBC # BLD: 6 K/UL (ref 3.5–11.3)
WBC # BLD: ABNORMAL 10*3/UL

## 2020-04-23 PROCEDURE — 84443 ASSAY THYROID STIM HORMONE: CPT

## 2020-04-23 PROCEDURE — 85025 COMPLETE CBC W/AUTO DIFF WBC: CPT

## 2020-04-23 PROCEDURE — 82550 ASSAY OF CK (CPK): CPT

## 2020-04-23 PROCEDURE — 83036 HEMOGLOBIN GLYCOSYLATED A1C: CPT

## 2020-04-23 PROCEDURE — 84481 FREE ASSAY (FT-3): CPT

## 2020-04-23 PROCEDURE — 84439 ASSAY OF FREE THYROXINE: CPT

## 2020-04-23 PROCEDURE — 84681 ASSAY OF C-PEPTIDE: CPT

## 2020-04-23 PROCEDURE — 80053 COMPREHEN METABOLIC PANEL: CPT

## 2020-04-23 PROCEDURE — 82607 VITAMIN B-12: CPT

## 2020-04-23 PROCEDURE — 80061 LIPID PANEL: CPT

## 2020-04-24 LAB
ESTIMATED AVERAGE GLUCOSE: 151 MG/DL
HBA1C MFR BLD: 6.9 % (ref 4–6)

## 2020-05-04 ENCOUNTER — OFFICE VISIT (OUTPATIENT)
Dept: ORTHOPEDIC SURGERY | Age: 70
End: 2020-05-04

## 2020-05-04 VITALS — BODY MASS INDEX: 38.64 KG/M2 | HEIGHT: 62 IN | WEIGHT: 210 LBS

## 2020-05-04 PROCEDURE — 99024 POSTOP FOLLOW-UP VISIT: CPT | Performed by: ORTHOPAEDIC SURGERY

## 2020-05-04 NOTE — PROGRESS NOTES
Procedure: Right reverse shoulder arthroplasty for fracture  Date of procedure: 3/23/2020    HPI: Ms. Leeanne Diaz is a 82-year-old who is approximately 6 weeks status post the aforementioned procedure. She indicates today that her pain has significantly improved but she remains sore distally below the elbow and also reports having some tingling in the forearm as well as tremors of her ring finger. She is currently undergoing physical therapy at home 3 times a week. She stopped wearing her sling 2 weeks ago. They are primarily focusing on pendulum exercises through the shoulder and range of motion exercises through her elbow and wrist.    Physical examination:  Evaluation patient's right shoulder and upper extremity demonstrates her incision to be appropriately healed. No wound dehiscence or drainage noted. No warmth or erythema present. Sensation is grossly intact light touch in all dermatomes including the axillary nerve distribution and she has 2+ radial pulse with brisk capillary refill in her fingers. I can actively flex and abduct her shoulder to at least 120 degrees but she is unable to actively do this on her own. Imaging studies: 4 x-ray views of the patient's right shoulder completed on 5/4/2020 were independently reviewed demonstrating no obvious dislocation or subluxation. Reverse shoulder arthroplasty appears to be well aligned and well fixed. Impression and plan: Ms. Leeanne Diaz is a 82-year-old approximately 6 weeks status post a right shoulder reverse shoulder arthroplasty for fracture dislocation. She is doing relatively well at this time. She is weak and unable to raise her arm up. She does appear to be firing her deltoid. At this time I recommend continuing on with physical therapy working on active and active assisted range of motion and isometric strengthening. A prescription was provided. She can use this arm for light activities of daily living.   I will see her back for reevaluation in

## 2020-06-16 ENCOUNTER — OFFICE VISIT (OUTPATIENT)
Dept: ORTHOPEDIC SURGERY | Age: 70
End: 2020-06-16

## 2020-06-16 VITALS — HEIGHT: 62 IN | BODY MASS INDEX: 38.64 KG/M2 | TEMPERATURE: 97 F | WEIGHT: 210 LBS

## 2020-06-16 PROCEDURE — 99024 POSTOP FOLLOW-UP VISIT: CPT | Performed by: ORTHOPAEDIC SURGERY

## 2020-06-16 NOTE — PROGRESS NOTES
Procedure: Right reverse shoulder arthroplasty for fracture  Date of procedure: 3/23/2020    HPI: Dana Espinoza is approximately 3 months status post the aforementioned procedure. At this time she states that she still little achy in the shoulder but that is getting better. She rates her pain as 2/10. She still has difficult time reaching above a certain level with this arm. She continues to have in-home physical therapy 3 times a week. Overall she feels that she is slowly getting better but would like to be further along. She states that her arm tightens up after period of inactivity. She denies having any fevers, chills, sweats or any constitutional symptoms. Physical examination:  Temp 97 °F (36.1 °C)   Ht 5' 2\" (1.575 m)   Wt 210 lb (95.3 kg)   BMI 38.41 kg/m²   General Appearance: alert, well appearing, and in no distress  Mental Status: alert, oriented to person, place, and time  Evaluation of the right shoulder and upper extremity demonstrates her shoulder incision to be healed appropriately. No wound dehiscence, drainage, warmth or erythema is appreciated. Sensation is grossly intact light touch in all dermatomes and she has a 2+ radial pulse with brisk capillary refill in all her fingers. Actively she has 70 degrees of shoulder elevation, 65 degrees of abduction and external rotation to 45 degrees. Passively she has 120 degrees of elevation, 110 degrees of abduction and 65 degrees of external rotation. she can actively flex and extend the wrist and flex, extend, abduct, and adduct all of her fingers. Imaging Studies: 4 x-ray views of the right shoulder completed on 6/16/2020 were independently reviewed demonstrating a reverse shoulder implant to be in acceptable alignment and well fixed without any obvious fracture, dislocation, or subluxation. No significant healing around tuberosity fragments appreciated.     Impression and plan: Dana Espinoza is approximately 3 months status post a

## 2020-09-21 ENCOUNTER — OFFICE VISIT (OUTPATIENT)
Dept: ORTHOPEDIC SURGERY | Age: 70
End: 2020-09-21
Payer: MEDICARE

## 2020-09-21 VITALS — HEIGHT: 62 IN | WEIGHT: 210 LBS | TEMPERATURE: 97.9 F | BODY MASS INDEX: 38.64 KG/M2

## 2020-09-21 PROCEDURE — G8427 DOCREV CUR MEDS BY ELIG CLIN: HCPCS | Performed by: ORTHOPAEDIC SURGERY

## 2020-09-21 PROCEDURE — 4040F PNEUMOC VAC/ADMIN/RCVD: CPT | Performed by: ORTHOPAEDIC SURGERY

## 2020-09-21 PROCEDURE — 99212 OFFICE O/P EST SF 10 MIN: CPT | Performed by: ORTHOPAEDIC SURGERY

## 2020-09-21 PROCEDURE — G8417 CALC BMI ABV UP PARAM F/U: HCPCS | Performed by: ORTHOPAEDIC SURGERY

## 2020-09-21 PROCEDURE — 1123F ACP DISCUSS/DSCN MKR DOCD: CPT | Performed by: ORTHOPAEDIC SURGERY

## 2020-09-21 PROCEDURE — 3017F COLORECTAL CA SCREEN DOC REV: CPT | Performed by: ORTHOPAEDIC SURGERY

## 2020-09-21 PROCEDURE — 1036F TOBACCO NON-USER: CPT | Performed by: ORTHOPAEDIC SURGERY

## 2020-09-21 PROCEDURE — 1090F PRES/ABSN URINE INCON ASSESS: CPT | Performed by: ORTHOPAEDIC SURGERY

## 2020-09-21 PROCEDURE — G8400 PT W/DXA NO RESULTS DOC: HCPCS | Performed by: ORTHOPAEDIC SURGERY

## 2020-09-21 RX ORDER — AMOXICILLIN 500 MG/1
CAPSULE ORAL
Qty: 6 CAPSULE | Refills: 0 | Status: SHIPPED | OUTPATIENT
Start: 2020-09-21

## 2020-09-21 NOTE — PROGRESS NOTES
Procedure: Right reverse shoulder arthroplasty for fracture  Date of procedure: 3/23/2020    HPI: Julien Delgado is approximately 6 months status post the aforementioned procedure. Today she states that she is \"not bad\". She still has some pain along the lateral aspect of her upper arm. She describes this as an ache that is usually present if she tries raising her arm up and also after prolonged period of inactivity and then use. She takes an Aleve which helps with this. She reports improvement in her motion in terms of being able to raise up her arm away from her body but states that she continues to struggle reaching behind her back. She has completed physical therapy about a month ago. Physical examination:  Temp 97.9 °F (36.6 °C) (Infrared)   Ht 5' 2\" (1.575 m)   Wt 210 lb (95.3 kg)   BMI 38.41 kg/m²   General Appearance: alert, well appearing, and in no distress  Mental Status: alert, oriented to person, place, and time  Evaluation of the right shoulder and upper extremity demonstrates her shoulder incision to be healed appropriately. No wound dehiscence, drainage, warmth or erythema is appreciated. Sensation is grossly intact light touch in all dermatomes and she has a 2+ radial pulse with brisk capillary refill in all her fingers. Actively she has 125 degrees of shoulder elevation, 120 degrees of abduction and external rotation to 45 degrees. She internally rotates to her buttock passively she has 125 degrees of elevation, 135 degrees of abduction and 55 degrees of external rotation. she can actively flex and extend the wrist and flex, extend, abduct, and adduct all of her fingers. Imaging Studies: 4 x-ray views of the right shoulder completed on 9/21/2020 were independently reviewed demonstrating a reverse shoulder implant to be in acceptable alignment and well fixed without any obvious fracture, dislocation, or subluxation.   No significant healing around tuberosity fragments appreciated. Impression and plan: Víctor Vo is approximately 6 months status post a right shoulder arthroplasty. She is doing well at this time with improvement in her active range of motion as well as function. Unfortunately she still has some achiness in her shoulder specifically along the lateral aspect of the upper arm. I had a discussion with the patient today with regards to the possible etiologies. We will continue to monitor this for now. She was encouraged to keep up with her home exercise program and can continue on with activities as she feels comfortable using this arm. I will see her back for reevaluation in 6 months but she was encouraged to return or call earlier with questions and or concerns.

## 2020-12-01 ENCOUNTER — HOSPITAL ENCOUNTER (OUTPATIENT)
Age: 70
Discharge: HOME | End: 2020-12-01
Payer: MEDICARE

## 2020-12-01 PROCEDURE — U0003 INFECTIOUS AGENT DETECTION BY NUCLEIC ACID (DNA OR RNA); SEVERE ACUTE RESPIRATORY SYNDROME CORONAVIRUS 2 (SARS-COV-2) (CORONAVIRUS DISEASE [COVID-19]), AMPLIFIED PROBE TECHNIQUE, MAKING USE OF HIGH THROUGHPUT TECHNOLOGIES AS DESCRIBED BY CMS-2020-01-R: HCPCS

## 2020-12-04 LAB — SARS-COV-2, NAA: NOT DETECTED

## 2020-12-28 NOTE — ED NOTES
Patient awake, alert, oriented x4. Patient tolerating ice chips. Patient denies any needs at this time. Call light within reach. Will continue to monitor.      Nichole Vergara RN  03/21/20 [employer]

## 2021-03-22 ENCOUNTER — OFFICE VISIT (OUTPATIENT)
Dept: ORTHOPEDIC SURGERY | Age: 71
End: 2021-03-22
Payer: MEDICARE

## 2021-03-22 VITALS — TEMPERATURE: 97.3 F | WEIGHT: 210 LBS | HEIGHT: 62 IN | BODY MASS INDEX: 38.64 KG/M2 | RESPIRATION RATE: 14 BRPM

## 2021-03-22 DIAGNOSIS — Z96.611 STATUS POST REVERSE TOTAL SHOULDER REPLACEMENT, RIGHT: Primary | ICD-10-CM

## 2021-03-22 PROCEDURE — G8427 DOCREV CUR MEDS BY ELIG CLIN: HCPCS | Performed by: ORTHOPAEDIC SURGERY

## 2021-03-22 PROCEDURE — 3017F COLORECTAL CA SCREEN DOC REV: CPT | Performed by: ORTHOPAEDIC SURGERY

## 2021-03-22 PROCEDURE — G8400 PT W/DXA NO RESULTS DOC: HCPCS | Performed by: ORTHOPAEDIC SURGERY

## 2021-03-22 PROCEDURE — 1090F PRES/ABSN URINE INCON ASSESS: CPT | Performed by: ORTHOPAEDIC SURGERY

## 2021-03-22 PROCEDURE — 1036F TOBACCO NON-USER: CPT | Performed by: ORTHOPAEDIC SURGERY

## 2021-03-22 PROCEDURE — 4040F PNEUMOC VAC/ADMIN/RCVD: CPT | Performed by: ORTHOPAEDIC SURGERY

## 2021-03-22 PROCEDURE — 1123F ACP DISCUSS/DSCN MKR DOCD: CPT | Performed by: ORTHOPAEDIC SURGERY

## 2021-03-22 PROCEDURE — G8417 CALC BMI ABV UP PARAM F/U: HCPCS | Performed by: ORTHOPAEDIC SURGERY

## 2021-03-22 PROCEDURE — 99213 OFFICE O/P EST LOW 20 MIN: CPT | Performed by: ORTHOPAEDIC SURGERY

## 2021-03-22 PROCEDURE — G8484 FLU IMMUNIZE NO ADMIN: HCPCS | Performed by: ORTHOPAEDIC SURGERY

## 2021-03-22 NOTE — PROGRESS NOTES
Procedure: Right reverse shoulder arthroplasty for fracture  Date of procedure: 3/23/2020    HPI: Srinivas Lopez is approximately 1 year status post the aforementioned procedure. she is doing well clinically but is not 100% indicating that she cannot reach behind her back and lifting with this arm is difficult. She has a difficult time as a result lifting her grandchildren with this arm. She occasionally has some soreness in the shoulder which she rates as a 1-2/10 at the most.    Physical examination:  Temperature 97.3 °F (36.3 °C), resp. rate 14, height 5' 2\" (1.575 m), weight 210 lb (95.3 kg). General Appearance: alert, well appearing, and in no distress  Mental Status: alert, oriented to person, place, and time  Evaluation of the right shoulder and upper extremity demonstrates her shoulder incision to be healed appropriately. No wound dehiscence, drainage, warmth or erythema is appreciated. Sensation is grossly intact light touch in all dermatomes and she has a 2+ radial pulse with brisk capillary refill in all her fingers. ROM: (Degrees)    Actively she has approximately 135 degrees of shoulder elevation, 125 degrees of abduction 30 degrees of external rotation and internal rotation to her buttock. Passively she has 140 degrees of shoulder elevation 135 degrees of abduction and 30 degrees of external rotation. Special tests    Right   Rotator Cuff    Left    n   Painful arc    n   n   Pain with ER    n    n   Neer's     n    n   Hawkin's    n    n   Drop Arm    n  n   Lift off/Belly Press   n  n   ER Lag    n      Imaging Studies: 4 x-ray views of the right shoulder completed on 3/22/21 were independently reviewed demonstrating a reverse shoulder implant to be in acceptable alignment and well fixed without any obvious fracture, dislocation, or subluxation. Impression and plan: Srinivas Lopez is approximately 1 yearstatus post a right reverse shoulder arthroplasty for fracture.   She is doing relatively well at this time although she still struggles in terms of her strength and ability to lift with this arm. As noted above she also has not able to reach to the middle of her back and so clasping her bra in this position can be difficult. Otherwise she is doing quite well. She is able to use her arm for most of what she would like to. At this time she was encouraged to keep up with her home exercise program and may continue to utilize this extremity as she feels comfortable. I will see her back in my clinic in another year for reevaluation but she was encouraged to return or call earlier with any questions under concerns. . she is doing well clinically at this time. I'll have her begin phase 3 shoulder exercises focusing on gentle progressive strengthening as needed. she may continue to use the arm for light activities of daily living and gradually advance as tolerated avoiding heavy lifting beyond 30 pounds. she is to follow up in 3 months for re-evaluation, but she was instructed to return or call earlier with any questions or concerns.

## (undated) DEVICE — SUTURE FIBERWIRE SZ 2 L38IN NONABSORBABLE BLU L36.6MM 1/2 AR7202

## (undated) DEVICE — SUTURE VCRL SZ 0 L27IN ABSRB UD L36MM CT-1 1/2 CIR J260H

## (undated) DEVICE — IMMOBILIZER SHLDR L10.5-17IN D7IN SLNG W/ 15DEG ABD PLLW

## (undated) DEVICE — SPONGE LAP W18XL18IN WHT COT 4 PLY FLD STRUNG RADPQ DISP ST

## (undated) DEVICE — SUTURE PROL SZ 3-0 L30IN NONABSORBABLE BLU L30MM FS-1 3/8 8675H

## (undated) DEVICE — SUTURE PROL SZ 1 L30IN NONABSORBABLE BLU L36MM CT-1 1/2 CIR 8425H

## (undated) DEVICE — Device

## (undated) DEVICE — SUTURE PROL SZ 3-0 L20IN NONABSORBABLE BLU L26MM CT-2 1/2 8410H

## (undated) DEVICE — GLOVE ORANGE PI 8   MSG9080

## (undated) DEVICE — TAP SURG DIA65MM SHLDR GUID RMR RSP

## (undated) DEVICE — 3M™ STERI-DRAPE™ U-DRAPE 1015: Brand: STERI-DRAPE™

## (undated) DEVICE — SUTURE ETHBND EXCEL SZ 5 L30IN NONABSORBABLE GRN L48MM CCS MB47G

## (undated) DEVICE — FOAM BUMP, LARGE: Brand: MEDLINE INDUSTRIES, INC.

## (undated) DEVICE — DRAPE C ARM UNIV W41XL74IN CLR PLAS XR VELC CLSR POLY STRP

## (undated) DEVICE — GEL US 20GM NONIRRITATING OVERWRAPPED FILE PCH TRNSMIT

## (undated) DEVICE — TOTAL TRAY, 16FR 10ML SIL FOLEY, URN: Brand: MEDLINE

## (undated) DEVICE — GOWN,AURORA,NONRNF,XL,30/CS: Brand: MEDLINE

## (undated) DEVICE — SUTURE FIBERLOOP SZ 2-0 L20IN NONABSORBABLE BLU STR NDL AR7234

## (undated) DEVICE — STRYKER PERFORMANCE SERIES SAGITTAL BLADE: Brand: STRYKER PERFORMANCE SERIES

## (undated) DEVICE — BIT DRL DIA4MM CALIB GRAD DEPTH MRK FOR ENCORE SYS

## (undated) DEVICE — Z DISCONTINUED USE 2423037 APPLICATOR MEDICATED 3 CC CHLORHEXIDINE GLUC 2% CHLORAPREP

## (undated) DEVICE — NEEDLE HYPO 25GA L1.5IN BLU POLYPR HUB S STL REG BVL STR

## (undated) DEVICE — SUTURE VCRL SZ 3-0 L27IN ABSRB UD L26MM CT-2 1/2 CIR J232H

## (undated) DEVICE — SUTURE FIBERTAPE FIBERWIRE SZ 2-0 30IN NONABSORB BLU AR72377

## (undated) DEVICE — TUBE IRRIG HNDPC HI FLO TP INTRPULS W/SUCTION TUBE

## (undated) DEVICE — STRIP,CLOSURE,WOUND,MEDI-STRIP,1/2X4: Brand: MEDLINE

## (undated) DEVICE — SHEET, ORTHO, SPLIT, STERILE: Brand: MEDLINE

## (undated) DEVICE — PROTECTOR ULN NRV PUR FOAM HK LOOP STRP ANATOMICALLY

## (undated) DEVICE — GLOVE ORANGE PI 7 1/2   MSG9075

## (undated) DEVICE — GLOVE ORANGE PI 8 1/2   MSG9085

## (undated) DEVICE — SUTURE ETHBND EXCEL SZ 1 L30IN NONABSORBABLE GRN L36MM CT-1 X425H

## (undated) DEVICE — NEEDLE SUT 1/2 CIR CUT TIP SZ 5 REG SURG

## (undated) DEVICE — GLOVE ORANGE PI 7   MSG9070

## (undated) DEVICE — DRESSING TRNSPAR W5XL4.5IN FLM SHT SEMIPERMEABLE WIND

## (undated) DEVICE — CEMENT MIXING SYSTEM WITH FEMORAL BREAKWAY NOZZLE: Brand: REVOLUTION

## (undated) DEVICE — INTENT TO BE USED WITH SUTURE MATERIAL FOR TISSUE CLOSURE: Brand: RICHARD-ALLAN® NEEDLE 1/2 CIRCLE TAPER

## (undated) DEVICE — DRAPE,U/ SHT,SPLIT,PLAS,STERIL: Brand: MEDLINE

## (undated) DEVICE — SUTURE SUTTAPE L40IN DIA1.3MM NONABSORBABLE WHT BLU L26.5MM AR7500

## (undated) DEVICE — ORTHO EXT PK

## (undated) DEVICE — SUTURE FIBERTAPE SZ 2-0 L36IN NONABSORBABLE BLU 2MM EA END AR7237

## (undated) DEVICE — COVER,MAYO STAND,STERILE: Brand: MEDLINE